# Patient Record
Sex: MALE | Race: WHITE | URBAN - METROPOLITAN AREA
[De-identification: names, ages, dates, MRNs, and addresses within clinical notes are randomized per-mention and may not be internally consistent; named-entity substitution may affect disease eponyms.]

---

## 2023-01-05 ENCOUNTER — PATIENT OUTREACH (OUTPATIENT)
Dept: FAMILY MEDICINE CLINIC | Facility: CLINIC | Age: 63
End: 2023-01-05

## 2023-01-05 NOTE — PROGRESS NOTES
[address] [facility]  Pt needs to reschedule appt with dr Patti Pierce to dr Cynthia Pereira

## 2023-01-25 ENCOUNTER — OFFICE VISIT (OUTPATIENT)
Dept: FAMILY MEDICINE CLINIC | Facility: CLINIC | Age: 63
End: 2023-01-25
Payer: COMMERCIAL

## 2023-01-25 VITALS
RESPIRATION RATE: 18 BRPM | TEMPERATURE: 98 F | WEIGHT: 228 LBS | SYSTOLIC BLOOD PRESSURE: 136 MMHG | BODY MASS INDEX: 32.64 KG/M2 | OXYGEN SATURATION: 98 % | DIASTOLIC BLOOD PRESSURE: 80 MMHG | HEIGHT: 70 IN | HEART RATE: 88 BPM

## 2023-01-25 DIAGNOSIS — M54.50 CHRONIC BILATERAL LOW BACK PAIN WITHOUT SCIATICA: ICD-10-CM

## 2023-01-25 DIAGNOSIS — Z12.11 SCREEN FOR COLON CANCER: ICD-10-CM

## 2023-01-25 DIAGNOSIS — G89.29 CHRONIC BILATERAL LOW BACK PAIN WITHOUT SCIATICA: ICD-10-CM

## 2023-01-25 DIAGNOSIS — E78.5 HYPERLIPIDEMIA, UNSPECIFIED HYPERLIPIDEMIA TYPE: Primary | ICD-10-CM

## 2023-01-25 RX ORDER — ROSUVASTATIN CALCIUM 10 MG/1
10 TABLET, COATED ORAL NIGHTLY
COMMUNITY
Start: 2022-12-28 | End: 2023-01-25

## 2023-01-25 RX ORDER — MELOXICAM 15 MG/1
TABLET ORAL
COMMUNITY
End: 2023-01-25

## 2023-01-25 RX ORDER — MELOXICAM 15 MG/1
15 TABLET ORAL DAILY PRN
Qty: 30 TABLET | Refills: 0 | Status: SHIPPED | OUTPATIENT
Start: 2023-01-25

## 2023-01-25 RX ORDER — ROSUVASTATIN CALCIUM 10 MG/1
10 TABLET, COATED ORAL NIGHTLY
Qty: 90 TABLET | Refills: 0 | Status: SHIPPED | OUTPATIENT
Start: 2023-01-25 | End: 2023-04-25

## 2023-01-31 ENCOUNTER — PATIENT MESSAGE (OUTPATIENT)
Dept: FAMILY MEDICINE CLINIC | Facility: CLINIC | Age: 63
End: 2023-01-31

## 2023-01-31 ENCOUNTER — TELEPHONE (OUTPATIENT)
Dept: FAMILY MEDICINE CLINIC | Facility: CLINIC | Age: 63
End: 2023-01-31

## 2023-01-31 NOTE — TELEPHONE ENCOUNTER
Called brother. States patient was watching TV and felt vertigo sensation, felt chills and had nausea/vomiting  Laid down for 1 hr and now feels ok  Eating lunch now  No vomiting  Vertigo sensation stopped  Hx of vertigo  States it happened in office when they last saw Dr Estelle Lipscomb if Dr Otis Padilla wanted to start medication  Aware she is out of office today  Advised if this happens again go to ER. Verbalized understanding.      See tel enc from 1/31/23

## 2023-01-31 NOTE — TELEPHONE ENCOUNTER
States patient was watching TV and felt vertigo sensation, felt chills and had nausea/vomiting  Laid down for 1 hr and now feels ok  Eating lunch now  No vomiting  Vertigo sensation stopped  Hx of vertigo  States it happened in office when they last saw Dr Tushar De Jesus if Dr Jesus Dumont wanted to start medication  Aware she is out of office today  Advised if this happens again go to ER. Verbalized understanding.

## 2023-01-31 NOTE — TELEPHONE ENCOUNTER
Regarding: Vertigo episode today  ----- Message from Jaden Parsons sent at 1/31/2023 11:46 AM CST -----       ----- Message from Kym Vargas to Rebekah Contreras MD sent at 1/31/2023 11:33 AM -----   Good Morning Dr. Katie Hernandez,    This is Félix's brother, Kacey Armstrong and Doreen Moy is currently having an issue with his Vertigo where he was sitting down watching TV and head started spinning, pain down his back , chills and then he started to vomit in the bathroom which i am not sure if he threw up or if it was the heaves, etc.     Do you want to see him or can you call him in some of the medicine that he should take or what shall we do?     We have been tracking his blood pressure and here are the results :  1/29 131/86 with pulse of 77 at 8:58am  1/30 130/81 with pulse of 89 at 7:45am  1/31 129/85 with pulse of 79 at 9:33am    Thanks  [facility]  [phone]

## 2023-02-01 RX ORDER — MECLIZINE HYDROCHLORIDE 25 MG/1
25 TABLET ORAL 2 TIMES DAILY PRN
Qty: 10 TABLET | Refills: 0 | Status: SHIPPED | OUTPATIENT
Start: 2023-02-01

## 2023-02-01 NOTE — TELEPHONE ENCOUNTER
Brother notified. Rx sent.     Future Appointments   Date Time Provider [facility]   4/21/2023  3:30 PM Key Burgos MD [facility]

## 2023-02-01 NOTE — TELEPHONE ENCOUNTER
With a history of vertigo, I can prescribe meclizine to use as needed.   Will need to follow-up if symptoms persist greater than few weeks  rx pended

## 2023-02-10 ENCOUNTER — TELEPHONE (OUTPATIENT)
Dept: FAMILY MEDICINE CLINIC | Facility: CLINIC | Age: 63
End: 2023-02-10

## 2023-02-10 DIAGNOSIS — R42 VERTIGO: Primary | ICD-10-CM

## 2023-02-10 RX ORDER — ONDANSETRON 4 MG/1
4 TABLET, FILM COATED ORAL DAILY PRN
Qty: 15 TABLET | Refills: 0 | Status: SHIPPED | OUTPATIENT
Start: 2023-02-10

## 2023-02-10 NOTE — TELEPHONE ENCOUNTER
Zofran sent to pharmacy  Use meclizine as needed  Place referral for Dr. Soniya Woodson -he can address vertigo if not better and also do ear check

## 2023-02-10 NOTE — TELEPHONE ENCOUNTER
LAST NIGHT HAD ANOTHER ISSUE WITH VERTIGO, VOMITING, VERY DIZZY, STOMACH PAINS,DIARREHA. HE HAS EATEN TODAY BUT JUST IS NOT FEELING WELL TODAY. BROTHER WOULD LIKE TO KNOW WHAT THE NEXT STEP? BLOOD PRESSURE HAS BEEN NORMAL. SHOULD HE SEE COME SEE  OR MAKE AN APPOINTMENT WITH ENT?   PLEASE ADVISE

## 2023-02-10 NOTE — TELEPHONE ENCOUNTER
Patient still having issues with vertigo  Last night he tried the meclizine - but threw the medication up so he had to wait until his stomach settled in order to try the medication  Nausea, vomiting, dizziness, diarrhea  Patient took the medication this morning and it helped   Brother wanting to know if zofran can be sent to help since he was sick much of the night because he could not keep meds down  Brother also wanting to know if patient should see ENT or follow up here  Blood pressure normal  No recent illness  No ear pain - but does get wax build up  When he removes his hearing aids feels like his ears are in a wind tunnel  Brother advised if patient develops fever or abdominal pain to go to IC

## 2023-02-17 ENCOUNTER — TELEPHONE (OUTPATIENT)
Dept: FAMILY MEDICINE CLINIC | Facility: CLINIC | Age: 63
End: 2023-02-17

## 2023-02-20 ENCOUNTER — PATIENT MESSAGE (OUTPATIENT)
Dept: FAMILY MEDICINE CLINIC | Facility: CLINIC | Age: 63
End: 2023-02-20

## 2023-02-20 RX ORDER — MELOXICAM 15 MG/1
15 TABLET ORAL DAILY PRN
Qty: 90 TABLET | Refills: 0 | Status: SHIPPED | OUTPATIENT
Start: 2023-02-20

## 2023-02-20 NOTE — TELEPHONE ENCOUNTER
From: Alvaro Padgett  To: Riaz Lee MD  Sent: 2/20/2023 12:48 PM CST  Subject: Blood Pressure Monitoring    This message is being sent by [employer] on behalf of Alvaro Padgett. Hi Dr. Key Brown,    This is [de-identified], Félix's brother and here are the blood pressure results from our monitoring and I wanted to know if we need to continue or can we stop taking his blood pressure? Can we also get Félix's Meloxicam refills changed to 6 months or 1 year prescriptions now since he has been taking this medicine daily and it really helps him? 1/29 131/86   1/30 130/81   1/31 129/85   2/1 129/84  2/2 132/87  2/3 124/77 and 133/82  2/4 136/87 and 131/91  2/5 128/90  2/6 116/79  2/20 128/85     I have tried taking his blood pressure in the morning or in the afternoon and they seem to be pretty consistent for him.      Thanks  Raudel

## 2023-02-27 RX ORDER — MELOXICAM 15 MG/1
15 TABLET ORAL DAILY PRN
Qty: 90 TABLET | Refills: 0 | OUTPATIENT
Start: 2023-02-27

## 2023-02-27 NOTE — TELEPHONE ENCOUNTER
LOV 1/25/23 to establish care. Meloxicam sent on 2/20/23 for #90 - Rx refused. ondansetron (ZOFRAN) 4 mg tablet 15 tablet 0 2/10/2023     meclizine 25 MG Oral Tab 10 tablet 0 2/1/2023     Future Appointments   Date Time Provider Cece Dorado   3/9/2023  9:30 AM [facility]   3/9/2023  9:45 AM Nishi Mike MD [facility]   4/21/2023  3:30 PM Rebekah Contreras MD [facility] [place]     Please advise.

## 2023-03-03 RX ORDER — ONDANSETRON 4 MG/1
4 TABLET, FILM COATED ORAL DAILY PRN
Qty: 15 TABLET | Refills: 0 | Status: SHIPPED | OUTPATIENT
Start: 2023-03-03

## 2023-03-03 RX ORDER — MECLIZINE HYDROCHLORIDE 25 MG/1
25 TABLET ORAL 2 TIMES DAILY PRN
Qty: 10 TABLET | Refills: 0 | Status: SHIPPED | OUTPATIENT
Start: 2023-03-03

## 2023-03-09 ENCOUNTER — OFFICE VISIT (OUTPATIENT)
Facility: LOCATION | Age: 63
End: 2023-03-09
Payer: COMMERCIAL

## 2023-03-09 VITALS — BODY MASS INDEX: 36 KG/M2 | WEIGHT: 250 LBS

## 2023-03-09 DIAGNOSIS — R42 VERTIGO: Primary | ICD-10-CM

## 2023-03-09 DIAGNOSIS — H81.10 BENIGN PAROXYSMAL POSITIONAL VERTIGO, UNSPECIFIED LATERALITY: Primary | ICD-10-CM

## 2023-03-09 DIAGNOSIS — H93.293 ABNORMAL AUDITORY PERCEPTION OF BOTH EARS: ICD-10-CM

## 2023-03-09 DIAGNOSIS — H90.3 SENSORY HEARING LOSS, BILATERAL: ICD-10-CM

## 2023-03-09 DIAGNOSIS — H93.13 TINNITUS OF BOTH EARS: ICD-10-CM

## 2023-03-09 PROCEDURE — 92567 TYMPANOMETRY: CPT | Performed by: AUDIOLOGIST

## 2023-03-09 PROCEDURE — 99204 OFFICE O/P NEW MOD 45 MIN: CPT | Performed by: OTOLARYNGOLOGY

## 2023-03-09 PROCEDURE — 92557 COMPREHENSIVE HEARING TEST: CPT | Performed by: AUDIOLOGIST

## 2023-03-09 RX ORDER — ONDANSETRON 4 MG/1
4 TABLET, ORALLY DISINTEGRATING ORAL EVERY 8 HOURS PRN
Qty: 20 TABLET | Refills: 2 | Status: SHIPPED | OUTPATIENT
Start: 2023-03-09

## 2023-03-09 RX ORDER — DIAZEPAM 2 MG/1
2 TABLET ORAL 3 TIMES DAILY
Qty: 90 TABLET | Refills: 0 | Status: SHIPPED | OUTPATIENT
Start: 2023-03-09

## 2023-03-09 NOTE — PROGRESS NOTES
Bess Calderon was seen for an audiometric evaluation and tympanogram today. Referred back to physician.     Judit Stephens MS, CCC-A

## 2023-03-15 ENCOUNTER — TELEPHONE (OUTPATIENT)
Dept: PHYSICAL THERAPY | Facility: HOSPITAL | Age: 63
End: 2023-03-15

## 2023-03-15 ENCOUNTER — APPOINTMENT (OUTPATIENT)
Dept: PHYSICAL THERAPY | Age: 63
End: 2023-03-15
Attending: OTOLARYNGOLOGY
Payer: MEDICAID

## 2023-03-22 ENCOUNTER — OFFICE VISIT (OUTPATIENT)
Dept: PHYSICAL THERAPY | Age: 63
End: 2023-03-22
Attending: OTOLARYNGOLOGY
Payer: MEDICAID

## 2023-03-22 DIAGNOSIS — H81.10 BENIGN PAROXYSMAL POSITIONAL VERTIGO, UNSPECIFIED LATERALITY: ICD-10-CM

## 2023-03-22 PROCEDURE — 97161 PT EVAL LOW COMPLEX 20 MIN: CPT

## 2023-03-22 PROCEDURE — 97112 NEUROMUSCULAR REEDUCATION: CPT

## 2023-03-22 NOTE — PROGRESS NOTES
VESTIBULAR EVALUATION:     Diagnosis:   Benign paroxysmal positional vertigo, unspecified laterality (H81.10)  Fall risk, gait abnormalities        Referring Provider: Kevin Hernandez  Date of Evaluation:    3/22/2023    Precautions:  None Next MD visit:   none scheduled  Date of Surgery: n/a     PATIENT SUMMARY   Anaya Arnett is a 66year old male who presents to therapy today with reports of last year around [facility] October had his first bout of vertigo. Went to ER. Testing was negative. He was given Meclizine- it helped relieve the issue. He was sent home. Went to ENT. This went away. He has had it 7 times since this time. He is on valium, he is nauseous. He states he has not had any episodes for about 4 days. He has been taking it easy. He is afraid is it going to happen again. He does not look down, he is careful in the shower, he lays on his L side. He gets dizzy laying in any other positions. He is very careful not to \"stir it up\"     Symptoms with cough/sneeze or loud noise: No  Falls: No   Has not driven since vertigo. Hx of migraines: Yes: used to take immitrex and lay in dark room. Has not taken this since [place]. Hx of vision issue: Yes: bright lights bother him lately   Hx of hearing issues: tinnitus    Dizziness: Current 0/10, Best 0/10, Worst 10/10  Quality: daze, worse than merry go round or yury wheel, \"feels like on a ride\"   Frequency/Duration:  Couple hours  Aggravates: Supine to/from sit, Bending over, Quick head movements and Looking/reaching up  Relieves: Not moving, Closing eyes, Sitting down and Lying down   He has been sitting in recliner, no light no sound, trying to see if that would help. Dizziness Handicap Inventory [facility]): 92     Current functional limitations include laying, bending, washing hair, donning doffing shoes .    Social history:  Has been limited all activities since dizziness started 2/2 to fear of worsening symptoms  Doreen Moy describes prior level of function dizziness on and off since September. Pt goals include to be able to \"get my normal life back\"   Past medical history was reviewed with Odalis Collado. Significant findings include  has a past medical history of Autism spectrum and Hyperlipidemia. Gloria Clark presents to physical therapy evaluation with primary c/o dizziness. The results of the objective tests and measures show negative positional testing at this time. Pt does have saccadic intrusions with smooth persuit. Additionally, some GEN direction changes which may be related to his medications at this time- will continue to monitor. No other central signs with oculomotor testing. Functional deficits include but are not limited to laying down, bending over, walking. Signs and symptoms are consistent with diagnosis of possible spontaneous recovery for BPPV. Will continue to monitor. Will assess for balance and fall risk next therapy session . Pt and PT discussed evaluation findings, pathology, POC and HEP. Pt voiced understanding and performs HEP correctly. Skilled Physical Therapy is medically necessary to address the above impairments and reach functional goals. OBJECTIVE:   Physical Exam:  Posture/Observation: forward shoulders, kyphosis   Neuro Screen: Sensation: SILT symmetrical      Coordination Testing:   Finger to Nose: WNL   Pronation/supination: WNL   Toe tapping:  WNL     Cervical spine ROM: Flex30, Ext 30 , R SB 20, L SB 20 , R ROT 30, L ROT 30   Adverse neuro signs with ROM: yes no: no     Occulomotor & Vestibulo-Ocular Exam:  Spontaneous Nystagmus: room light: ;  fixation blocked: WFL  Smooth Pursuit: saccadic intrusions  Saccades: Negative  Gaze Evoked Nystagmus:  room light: R beating with R gaze, L beating with L gaze; fixation blocked: Negative  Head Thrust: Negative  VOR screen:  negative    VOR Cancellation: Negative   Convergence: Not Tested  Cover/Uncover:  Not Tested  Cross Cover:  Not Tested  Head Shaking Nystagmus: Not Tested  Dynamic Visual Acuity:  Not Tested    Positional Testing:   Sharps-Hallpike: R negative- modified testing, L negative- modified testing   Roll Test [facility]     Functional Mobility:   Gait: pt ambulates on level ground with short shuffling gait pattern. Today's Treatment and Response:   Pt education was provided on exam findings, treatment diagnosis, treatment plan, expectations, and prognosis. Pt was also provided recommendations for BPPV, pathology, treatment, expectations, prognosis, plan of care, interventions and benefits, goals, follow up, recommendations for activities the remainder of the day, residual dizziness for remainder of the day    Patient was instructed in and issued a HEP for: return to normal activities. Stressed importance of head and neck movements, fear avoidance behaviors and negative side effects of this. Charges: PT Eval Low Complexity, neuro brooks: 1      Total Timed Treatment: 10 min     Total Treatment Time: 45 min     PLAN OF CARE:    Goals: (to be met in 8 visits)  1. Pt will demonstrate appropriate mechanics and independence with HEP to be met in 2 visits. 2. Pt will demonstrate ability to transfer supine >sit with dizziness <3/10 to be met in 3 visits  3. Pt will demonstrate ability to look up with dizziness <3/10 to be met in 3 visits. Frequency / Duration: Patient will be seen for 1-2 x/week or a total of 8 visits over a 90 day period. Treatment will include: home exercise program development and instruction, patient/family education, balance training, canalith repositioning maneuver, eye/head coordination exercises, sensory organization training, optokinetic stimulation , ROM, exertion training, gait training, manual therapy and strengthening. Education or treatment limitation: None   Rehab Potential: good     Patient/Family/Caregiver was advised of these findings, precautions, and treatment options and has agreed to actively participate in planning and for this course of care. Thank you for your referral. Please co-sign or sign and return this letter via fax as soon as possible to [phone]. If you have any questions, please contact me at Dept: [phone]    Sincerely,  Electronically signed by therapist: Gin Parsons PT  [de-identified] certification required: Yes  I certify the need for these services furnished under this plan of treatment and while under my care.     X___________________________________________________ Date____________________    Certification From: 1/05/8516  To:6/20/2023

## 2023-03-23 ENCOUNTER — APPOINTMENT (OUTPATIENT)
Dept: PHYSICAL THERAPY | Age: 63
End: 2023-03-23
Attending: OTOLARYNGOLOGY
Payer: MEDICAID

## 2023-03-30 ENCOUNTER — APPOINTMENT (OUTPATIENT)
Dept: PHYSICAL THERAPY | Age: 63
End: 2023-03-30
Attending: OTOLARYNGOLOGY
Payer: MEDICAID

## 2023-04-03 ENCOUNTER — TELEPHONE (OUTPATIENT)
Dept: FAMILY MEDICINE CLINIC | Facility: CLINIC | Age: 63
End: 2023-04-03

## 2023-04-03 DIAGNOSIS — R42 VERTIGO: Primary | ICD-10-CM

## 2023-04-05 ENCOUNTER — OFFICE VISIT (OUTPATIENT)
Dept: PHYSICAL THERAPY | Age: 63
End: 2023-04-05
Attending: OTOLARYNGOLOGY
Payer: MEDICAID

## 2023-04-05 PROCEDURE — 97110 THERAPEUTIC EXERCISES: CPT

## 2023-04-05 NOTE — PROGRESS NOTES
Diagnosis:   Benign paroxysmal positional vertigo, unspecified laterality (H81.10)  Fall risk, gait abnormalities      Referring Provider: Robson Lopes  Date of Evaluation:    3/22/2023    Precautions:  None Next MD visit:   none scheduled  Date of Surgery: n/a   Insurance Primary/Secondary: MEDICAID / N/A     # Auth Visits: medicaid           Progress Summary  Pt has attended 2 visits in Physical Therapy. Subjective: Pt states 2 days ago. He started with stomach pain, ringing in ear, went into episode of dizziness. He states he threw up 10 times, diarrhea- 6 hours. \"dazed and spinning\"  - he took his medication and this took 2 hours to kick in. He was in bed for the last 2 days. He arrives today for reassessment. He states no more than a daze yesterday. No dizziness since this time. He took a shower and symptoms were relatively unremarkable. Pain: 0/10- not chief complaint. Dizziness 0/10    Objective:   HR: 77 bpm  Sp02: 98%   BP: 118/75 mmhg     Oculomotor exam:   sm persuit: negative   Gaze evoked nystagmus negative  Spontaneous nystagmus   VOR and VOR cancellation: negative     Cervical AROM   Limited grossly all directions but unremarkable for symptoms through available range- flexion/extension/rotation R/L     Mobility and functional movements:   Sidelying R/L: WFL no symptoms   Supine <>sit: WFL no symptoms   Modified hallpike negative R/L     Gait: no ataxia noted     Assessment: during reassessment vitals unremarkable. Cervical, oculomotor, postural and positional screens all negative for symptom reproduction. Based on subjective it is possible pt had GI virus causing dehydration and potentially triggering this episode. He was educated on hydration and slow return to PLOF. He was 2 weeks symptom free prior to this episode and has no symptoms during session today. Updated POC below. Pt was very pleasant and appreciative. Goals: (to be met in 8 visits)   1.  Pt will demonstrate appropriate mechanics and independence with HEP to be met in 2 visits. 2. Pt will demonstrate ability to transfer supine >sit with dizziness <3/10 to be met in 3 visits  3. Pt will demonstrate ability to look up with dizziness <3/10 to be met in 3 visits. Plan: Continue per plan of care if symptoms persist. If pt is without symptoms for 6-8 weeks and has no further contact with this office- discharge to indep HEP at that time. Patient/Family/Caregiver was advised of these findings, precautions, and treatment options and has agreed to actively participate in planning and for this course of care. Thank you for your referral. If you have any questions, please contact me at Dept: [phone]    Sincerely,  Electronically signed by therapist: Micky Hyde PT   Date: 4/5/2023  TX#: 2/6 Date:                 TX#: 3/ Date:                 TX#: 4/ Date:                 TX#: 5/ Date: Tx#: 6/   Neuro brooks: (30 min)  Reassessment above 20 min  Bed mobility: Supine, SL R/L unremarkable, Supine<>Sit unremarkable 5 min  Pt education: possible pathology, negative s/s for peripheral cause at this time.  Plan of care, answered all questions and concerns                            HEP: none    Charges: Neuro brooks: 2  (there ex: 2)- per medicaid        Total Timed Treatment: 30 min  Total Treatment Time: 30 min

## 2023-04-06 ENCOUNTER — APPOINTMENT (OUTPATIENT)
Dept: PHYSICAL THERAPY | Age: 63
End: 2023-04-06
Attending: OTOLARYNGOLOGY
Payer: MEDICAID

## 2023-04-12 ENCOUNTER — APPOINTMENT (OUTPATIENT)
Dept: PHYSICAL THERAPY | Age: 63
End: 2023-04-12
Attending: OTOLARYNGOLOGY
Payer: MEDICAID

## 2023-04-17 ENCOUNTER — NURSE ONLY (OUTPATIENT)
Dept: FAMILY MEDICINE CLINIC | Facility: CLINIC | Age: 63
End: 2023-04-17
Payer: COMMERCIAL

## 2023-04-17 DIAGNOSIS — R19.5 OCCULT BLOOD IN STOOLS: Primary | ICD-10-CM

## 2023-04-17 PROCEDURE — 82274 ASSAY TEST FOR BLOOD FECAL: CPT | Performed by: FAMILY MEDICINE

## 2023-04-20 ENCOUNTER — APPOINTMENT (OUTPATIENT)
Dept: PHYSICAL THERAPY | Age: 63
End: 2023-04-20
Attending: OTOLARYNGOLOGY
Payer: MEDICAID

## 2023-04-20 ENCOUNTER — OFFICE VISIT (OUTPATIENT)
Dept: NEUROLOGY | Facility: CLINIC | Age: 63
End: 2023-04-20
Payer: COMMERCIAL

## 2023-04-20 VITALS
BODY MASS INDEX: 36 KG/M2 | WEIGHT: 250.5 LBS | DIASTOLIC BLOOD PRESSURE: 68 MMHG | SYSTOLIC BLOOD PRESSURE: 130 MMHG | HEART RATE: 64 BPM | RESPIRATION RATE: 16 BRPM

## 2023-04-20 DIAGNOSIS — R42 VERTIGO: Primary | ICD-10-CM

## 2023-04-20 DIAGNOSIS — H93.19 TINNITUS, UNSPECIFIED LATERALITY: ICD-10-CM

## 2023-04-20 DIAGNOSIS — G43.009 MIGRAINE WITHOUT AURA AND WITHOUT STATUS MIGRAINOSUS, NOT INTRACTABLE: ICD-10-CM

## 2023-04-20 PROCEDURE — 3075F SYST BP GE 130 - 139MM HG: CPT | Performed by: OTHER

## 2023-04-20 PROCEDURE — 3078F DIAST BP <80 MM HG: CPT | Performed by: OTHER

## 2023-04-20 PROCEDURE — 99204 OFFICE O/P NEW MOD 45 MIN: CPT | Performed by: OTHER

## 2023-04-20 RX ORDER — DIVALPROEX SODIUM 250 MG/1
250 TABLET, EXTENDED RELEASE ORAL DAILY
Qty: 60 TABLET | Refills: 3 | Status: SHIPPED | OUTPATIENT
Start: 2023-04-20

## 2023-04-20 RX ORDER — DIAZEPAM 5 MG/1
5 TABLET ORAL DAILY PRN
Qty: 2 TABLET | Refills: 0 | Status: SHIPPED | OUTPATIENT
Start: 2023-04-20 | End: 2023-04-21

## 2023-04-21 ENCOUNTER — OFFICE VISIT (OUTPATIENT)
Dept: FAMILY MEDICINE CLINIC | Facility: CLINIC | Age: 63
End: 2023-04-21
Payer: COMMERCIAL

## 2023-04-21 ENCOUNTER — TELEPHONE (OUTPATIENT)
Dept: FAMILY MEDICINE CLINIC | Facility: CLINIC | Age: 63
End: 2023-04-21

## 2023-04-21 VITALS
WEIGHT: 216 LBS | SYSTOLIC BLOOD PRESSURE: 128 MMHG | HEIGHT: 70 IN | TEMPERATURE: 97 F | DIASTOLIC BLOOD PRESSURE: 80 MMHG | HEART RATE: 69 BPM | RESPIRATION RATE: 16 BRPM | OXYGEN SATURATION: 97 % | BODY MASS INDEX: 30.92 KG/M2

## 2023-04-21 DIAGNOSIS — Z00.00 ANNUAL PHYSICAL EXAM: Primary | ICD-10-CM

## 2023-04-21 DIAGNOSIS — E78.5 HYPERLIPIDEMIA, UNSPECIFIED HYPERLIPIDEMIA TYPE: ICD-10-CM

## 2023-04-21 RX ORDER — MELOXICAM 15 MG/1
15 TABLET ORAL DAILY PRN
Qty: 90 TABLET | Refills: 0 | Status: SHIPPED | OUTPATIENT
Start: 2023-04-21

## 2023-04-21 NOTE — TELEPHONE ENCOUNTER
Called [facility] and spoke with Melony Banerjee  Per [employer] lab never received the specimen.   Patient's brother Talbot Brunner notified at [address] with  on 4/21/23

## 2023-04-22 LAB — HEMOCCULT STL QL: NEGATIVE

## 2023-04-27 ENCOUNTER — APPOINTMENT (OUTPATIENT)
Dept: PHYSICAL THERAPY | Age: 63
End: 2023-04-27
Attending: OTOLARYNGOLOGY
Payer: MEDICAID

## 2023-05-01 ENCOUNTER — HOSPITAL ENCOUNTER (EMERGENCY)
Facility: HOSPITAL | Age: 63
Discharge: HOME | End: 2023-05-02
Attending: EMERGENCY MEDICINE
Payer: MEDICARE

## 2023-05-01 ENCOUNTER — APPOINTMENT (OUTPATIENT)
Dept: MRI IMAGING | Facility: HOSPITAL | Age: 63
End: 2023-05-01
Attending: EMERGENCY MEDICINE
Payer: MEDICARE

## 2023-05-01 ENCOUNTER — TELEPHONE (OUTPATIENT)
Dept: NEUROLOGY | Facility: CLINIC | Age: 63
End: 2023-05-01

## 2023-05-01 DIAGNOSIS — R42 VERTIGO: Primary | ICD-10-CM

## 2023-05-01 LAB
ALBUMIN SERPL-MCNC: 3.9 G/DL (ref 3.4–5)
ALBUMIN/GLOB SERPL: 1.2 {RATIO} (ref 1–2)
ALP LIVER SERPL-CCNC: 68 U/L
ALT SERPL-CCNC: 28 U/L
ANION GAP SERPL CALC-SCNC: 6 MMOL/L (ref 0–18)
AST SERPL-CCNC: 15 U/L (ref 15–37)
BASOPHILS # BLD AUTO: 0.04 X10(3) UL (ref 0–0.2)
BASOPHILS NFR BLD AUTO: 0.6 %
BILIRUB SERPL-MCNC: 0.4 MG/DL (ref 0.1–2)
BUN BLD-MCNC: 15 MG/DL (ref 7–18)
CALCIUM BLD-MCNC: 9 MG/DL (ref 8.5–10.1)
CHLORIDE SERPL-SCNC: 108 MMOL/L (ref 98–112)
CO2 SERPL-SCNC: 27 MMOL/L (ref 21–32)
CREAT BLD-MCNC: 0.98 MG/DL
EOSINOPHIL # BLD AUTO: 0.1 X10(3) UL (ref 0–0.7)
EOSINOPHIL NFR BLD AUTO: 1.5 %
ERYTHROCYTE [DISTWIDTH] IN BLOOD BY AUTOMATED COUNT: 14.8 %
GFR SERPLBLD BASED ON 1.73 SQ M-ARVRAT: 88 ML/MIN/1.73M2 (ref 60–?)
GLOBULIN PLAS-MCNC: 3.3 G/DL (ref 2.8–4.4)
GLUCOSE BLD-MCNC: 95 MG/DL (ref 70–99)
HCT VFR BLD AUTO: 50.4 %
HGB BLD-MCNC: 16.9 G/DL
IMM GRANULOCYTES # BLD AUTO: 0.02 X10(3) UL (ref 0–1)
IMM GRANULOCYTES NFR BLD: 0.3 %
LYMPHOCYTES # BLD AUTO: 2.35 X10(3) UL (ref 1–4)
LYMPHOCYTES NFR BLD AUTO: 36.3 %
MCH RBC QN AUTO: 29.4 PG (ref 26–34)
MCHC RBC AUTO-ENTMCNC: 33.5 G/DL (ref 31–37)
MCV RBC AUTO: 87.7 FL
MONOCYTES # BLD AUTO: 0.54 X10(3) UL (ref 0.1–1)
MONOCYTES NFR BLD AUTO: 8.3 %
NEUTROPHILS # BLD AUTO: 3.42 X10 (3) UL (ref 1.5–7.7)
NEUTROPHILS # BLD AUTO: 3.42 X10(3) UL (ref 1.5–7.7)
NEUTROPHILS NFR BLD AUTO: 53 %
OSMOLALITY SERPL CALC.SUM OF ELEC: 293 MOSM/KG (ref 275–295)
PLATELET # BLD AUTO: 211 10(3)UL (ref 150–450)
POTASSIUM SERPL-SCNC: 3.9 MMOL/L (ref 3.5–5.1)
PROT SERPL-MCNC: 7.2 G/DL (ref 6.4–8.2)
RBC # BLD AUTO: 5.75 X10(6)UL
SODIUM SERPL-SCNC: 141 MMOL/L (ref 136–145)
TROPONIN I HIGH SENSITIVITY: <3 NG/L
WBC # BLD AUTO: 6.5 X10(3) UL (ref 4–11)

## 2023-05-01 PROCEDURE — 85025 COMPLETE CBC W/AUTO DIFF WBC: CPT

## 2023-05-01 PROCEDURE — 93005 ELECTROCARDIOGRAM TRACING: CPT

## 2023-05-01 PROCEDURE — A9575 INJ GADOTERATE MEGLUMI 0.1ML: HCPCS

## 2023-05-01 PROCEDURE — 96361 HYDRATE IV INFUSION ADD-ON: CPT

## 2023-05-01 PROCEDURE — 93010 ELECTROCARDIOGRAM REPORT: CPT

## 2023-05-01 PROCEDURE — 70544 MR ANGIOGRAPHY HEAD W/O DYE: CPT | Performed by: EMERGENCY MEDICINE

## 2023-05-01 PROCEDURE — 85025 COMPLETE CBC W/AUTO DIFF WBC: CPT | Performed by: EMERGENCY MEDICINE

## 2023-05-01 PROCEDURE — 80053 COMPREHEN METABOLIC PANEL: CPT

## 2023-05-01 PROCEDURE — 70553 MRI BRAIN STEM W/O & W/DYE: CPT | Performed by: EMERGENCY MEDICINE

## 2023-05-01 PROCEDURE — 84484 ASSAY OF TROPONIN QUANT: CPT

## 2023-05-01 PROCEDURE — 99285 EMERGENCY DEPT VISIT HI MDM: CPT

## 2023-05-01 PROCEDURE — 99284 EMERGENCY DEPT VISIT MOD MDM: CPT

## 2023-05-01 PROCEDURE — 96374 THER/PROPH/DIAG INJ IV PUSH: CPT

## 2023-05-01 PROCEDURE — 96375 TX/PRO/DX INJ NEW DRUG ADDON: CPT

## 2023-05-01 PROCEDURE — 84484 ASSAY OF TROPONIN QUANT: CPT | Performed by: EMERGENCY MEDICINE

## 2023-05-01 PROCEDURE — 80053 COMPREHEN METABOLIC PANEL: CPT | Performed by: EMERGENCY MEDICINE

## 2023-05-01 RX ORDER — SODIUM CHLORIDE 9 MG/ML
1000 INJECTION, SOLUTION INTRAVENOUS ONCE
Status: COMPLETED | OUTPATIENT
Start: 2023-05-01 | End: 2023-05-02

## 2023-05-01 RX ORDER — ONDANSETRON 2 MG/ML
4 INJECTION INTRAMUSCULAR; INTRAVENOUS ONCE
Status: COMPLETED | OUTPATIENT
Start: 2023-05-01 | End: 2023-05-01

## 2023-05-01 RX ORDER — GADOTERATE MEGLUMINE 376.9 MG/ML
20 INJECTION INTRAVENOUS
Status: COMPLETED | OUTPATIENT
Start: 2023-05-01 | End: 2023-05-01

## 2023-05-01 RX ORDER — LORAZEPAM 2 MG/ML
1 INJECTION INTRAMUSCULAR ONCE
Status: COMPLETED | OUTPATIENT
Start: 2023-05-01 | End: 2023-05-01

## 2023-05-01 NOTE — TELEPHONE ENCOUNTER
Pt's brother following up on call from this morning. Pt has not improved. Please advise as soon as possible at [phone].

## 2023-05-01 NOTE — TELEPHONE ENCOUNTER
Pt is extremely sick and in bed for 2 days, room spinning, 2 meal in 2 days, bad headache also, has been taking meds as directed. Transferred call to nurse.

## 2023-05-01 NOTE — TELEPHONE ENCOUNTER
S:  Patient in bed since yesterday around lunch    B: Patient has episodes of vertigo/migraines    Per LOV notes from 04/20/2023:  Orders Placed This Encounter      Referral to Physical Therapy and Rehab      MRI BRAIN/IAC (ALL W+WO CNTRST) (CPT=70553)  EEG  depakote low dose trial  Diazepam, meclizine, zofran prn  ENT to follow  See orders and medications filed with this encounter. The patient indicates understanding of these issues and agrees with the plan. Discussed with patient/family regarding assessment, work up, care plan and possible adverse and side effects of the medications. I called his brother Raudel re; care plan, diazepam sent in case he needs it prior to MRI  RTC 3 months, requested update in 2 weeks, may increase depakote to 250 mg bid if tolerating well  Pt should go ER for any new or worsening symptoms and contact office. A:  Patient stood up from the lunch table yesterday. He was nauseous. He took medication but then sat in the bathroom all afternoon, he never did vomit. He went to his room and has been there ever since. He has not been able to do anything but sit in his dark room under his blankets. He was successful for the last 10 days without any episodes. Patient has not missed any medication doses. Divalproex 250 mg daily. Inquired on fluids. Patient intakes around 50 oz of water a day with decaf coffee.      R:

## 2023-05-02 VITALS
WEIGHT: 210 LBS | TEMPERATURE: 98 F | SYSTOLIC BLOOD PRESSURE: 117 MMHG | HEART RATE: 64 BPM | RESPIRATION RATE: 14 BRPM | BODY MASS INDEX: 30.06 KG/M2 | DIASTOLIC BLOOD PRESSURE: 77 MMHG | HEIGHT: 70 IN | OXYGEN SATURATION: 95 %

## 2023-05-02 LAB
ATRIAL RATE: 73 BPM
P AXIS: 39 DEGREES
P-R INTERVAL: 154 MS
Q-T INTERVAL: 380 MS
QRS DURATION: 70 MS
QTC CALCULATION (BEZET): 418 MS
R AXIS: -37 DEGREES
T AXIS: 49 DEGREES
VENTRICULAR RATE: 73 BPM

## 2023-05-02 RX ORDER — ONDANSETRON 4 MG/1
4 TABLET, ORALLY DISINTEGRATING ORAL EVERY 4 HOURS PRN
Qty: 10 TABLET | Refills: 0 | Status: SHIPPED | OUTPATIENT
Start: 2023-05-02 | End: 2023-05-09

## 2023-05-02 RX ORDER — LORAZEPAM 0.5 MG/1
0.5 TABLET ORAL EVERY 6 HOURS PRN
Qty: 15 TABLET | Refills: 0 | Status: SHIPPED | OUTPATIENT
Start: 2023-05-02 | End: 2023-05-04

## 2023-05-02 NOTE — ED INITIAL ASSESSMENT (HPI)
Pt c/o HA, dizziness/lightheadedness, n/v/d for 2 days. States hx Vertigo. He denies abd pain.  Pt denies CP, he denies ROXANNE

## 2023-05-02 NOTE — DISCHARGE INSTRUCTIONS
DO NOT TAKE DIAZEPAM AT [address] [place] [address] ED IF SYMPTOMS WORSEN OR IF ANY OTHER PROBLEMS ARISE

## 2023-05-04 ENCOUNTER — PATIENT MESSAGE (OUTPATIENT)
Dept: NEUROLOGY | Facility: CLINIC | Age: 63
End: 2023-05-04

## 2023-05-04 ENCOUNTER — APPOINTMENT (OUTPATIENT)
Dept: PHYSICAL THERAPY | Age: 63
End: 2023-05-04
Attending: OTOLARYNGOLOGY
Payer: MEDICAID

## 2023-05-04 RX ORDER — LORAZEPAM 0.5 MG/1
0.5 TABLET ORAL EVERY 6 HOURS PRN
Qty: 20 TABLET | Refills: 0 | Status: SHIPPED | OUTPATIENT
Start: 2023-05-04

## 2023-05-04 NOTE — TELEPHONE ENCOUNTER
From: Fidencio Valero  To: Edith Scheuermann, MD  Sent: 5/4/2023 9:05 AM CDT  Subject: Félix's Medication Refill    This message is being sent by [employer] on behalf of Dylon Guzmán. Good morning Doctor,    Dell Gonzáles has stated that Lorazepam is working better than the other medication and please go ahead and prescribe him this medication since he only has a couple of days left worth from the emergency room.     Thanks  Raudel

## 2023-05-05 ENCOUNTER — TELEPHONE (OUTPATIENT)
Dept: NEUROLOGY | Facility: CLINIC | Age: 63
End: 2023-05-05

## 2023-05-05 ENCOUNTER — NURSE ONLY (OUTPATIENT)
Dept: ELECTROPHYSIOLOGY | Facility: HOSPITAL | Age: 63
End: 2023-05-05
Attending: Other
Payer: MEDICAID

## 2023-05-05 DIAGNOSIS — R42 VERTIGO: ICD-10-CM

## 2023-05-05 DIAGNOSIS — H93.19 TINNITUS, UNSPECIFIED LATERALITY: ICD-10-CM

## 2023-05-05 PROCEDURE — 95816 EEG AWAKE AND DROWSY: CPT

## 2023-05-05 NOTE — TELEPHONE ENCOUNTER
Pt's brother, Kristine Garcia is calling to state pt has the Lorazepam that he will be picking up but the pt will be alone once they leave for vacation and Raudel is just needing clarity on how the pt will be able to get his refills on the medication since he can't drive and no one will be around to get any additional medication.  Raudel is requesting to speak with nurse or provider at [phone]

## 2023-05-05 NOTE — TELEPHONE ENCOUNTER
Patient has been using the lorazepam every 6 hours. Advised that this medication is only as needed. As of today he will be using as needed. Patient will call if needed to have medication refilled.

## 2023-05-08 NOTE — PROCEDURES
Date of Procedure: 5/5/2023    Procedure: EEG (ELECTROENCEPHALOGRAM) 41-60 minutes    DIAGNOSIS: VERTIGO  HISTORY: A 66YEAR OLD MALE PRESENTS WITH COMPLAINTS OF CHRONIC HEADACHES. ASSOCIATED SYMPTOMS INCLUDE TINNITIUS, NAUSEA, VOMITING, DIZZINESS AND HEARING LOSS. EPISODES BEGAN IN AUGUST 2022. HE STATES HE HAS HAD 21 TOTAL EPISODES. NO ASSOCIATED NUMBNESS, TINGLING, SLURRED SPEECH OR WEAKNESS. NO ABNORMAL MOVEMENTS NOTED. NO HISTORY OF SEIZURES  PAST MEDICAL HISTORY: AUTISM SPECTRUM, HYPERLIPIDEMIA  MEDICATIONS: LORAZEPAM, ONDANSETRON, MELOXICAM, DIVALPROEX  PREVIOUS EEG: NONE  MRI: 5/1/23 MILD CHANGES AS ABOVE. NO ACUTE INFARCT, OR LARGE VESSEL OCCLUSION. NO MASS LESION  PATIENT STATE: ALERT AND ORIENTED X 3  PATIENT STATE DURING EEG: AWAKE,     BACKGROUND ACTIVITY: Posterior rhythm was in the range of 8-10 Hz, reactive to eye opening; symmetrical and synchronous. Noted also are intermittent slowing of background activity. Drowsiness is characterized by intermittent theta waves bitemporally. Occasional sharp transients noted in posterior region. EPILEPTIFORM DISCHARGES: There were no epileptiform discharges or periodic lateralized epileptiform discharges (PLEDs) recorded throughout the recording. HYPERVENTILATION: Hyperventilation was performed with no change. PHOTIC STIMULATION: Photic stimulation was performed with no change. Stage II sleep was not reached. IMPRESSION: This is a normal awake and drowsy EEG. However, this does not rule out seizure disorder. Clinical correlation is advised.     Dinorah Jimenez MD   Neurology  [facility]  5/8/2023, 3:33 PM  CC: Eliana Yarbrough MD

## 2023-05-09 ENCOUNTER — TELEPHONE (OUTPATIENT)
Dept: NEUROLOGY | Facility: CLINIC | Age: 63
End: 2023-05-09

## 2023-05-09 NOTE — TELEPHONE ENCOUNTER
Call received from patient requesting EEG results. Patient notified that EEG normal. Patient also requested result be sent to him via [employer] attachment. Results sent to patient via [address]

## 2023-05-11 ENCOUNTER — APPOINTMENT (OUTPATIENT)
Dept: PHYSICAL THERAPY | Age: 63
End: 2023-05-11
Attending: OTOLARYNGOLOGY
Payer: MEDICAID

## 2023-05-23 ENCOUNTER — OFFICE VISIT (OUTPATIENT)
Dept: NEUROLOGY | Facility: CLINIC | Age: 63
End: 2023-05-23
Payer: COMMERCIAL

## 2023-05-23 VITALS
SYSTOLIC BLOOD PRESSURE: 118 MMHG | HEART RATE: 77 BPM | WEIGHT: 210 LBS | BODY MASS INDEX: 30 KG/M2 | DIASTOLIC BLOOD PRESSURE: 66 MMHG | RESPIRATION RATE: 16 BRPM

## 2023-05-23 DIAGNOSIS — H93.19 TINNITUS, UNSPECIFIED LATERALITY: Primary | ICD-10-CM

## 2023-05-23 PROCEDURE — 3074F SYST BP LT 130 MM HG: CPT | Performed by: OTHER

## 2023-05-23 PROCEDURE — 99214 OFFICE O/P EST MOD 30 MIN: CPT | Performed by: OTHER

## 2023-05-23 PROCEDURE — 3078F DIAST BP <80 MM HG: CPT | Performed by: OTHER

## 2023-05-23 RX ORDER — LORAZEPAM 0.5 MG/1
0.5 TABLET ORAL EVERY 6 HOURS PRN
Qty: 20 TABLET | Refills: 0 | Status: SHIPPED | OUTPATIENT
Start: 2023-05-23

## 2023-05-23 RX ORDER — ROSUVASTATIN CALCIUM 10 MG/1
10 TABLET, COATED ORAL NIGHTLY
COMMUNITY
Start: 2023-05-04

## 2023-05-25 ENCOUNTER — APPOINTMENT (OUTPATIENT)
Dept: PHYSICAL THERAPY | Age: 63
End: 2023-05-25
Attending: OTOLARYNGOLOGY
Payer: MEDICAID

## 2023-05-30 NOTE — TELEPHONE ENCOUNTER
LOV 4/21/23 for a physical.     Cholesterol Medication Protocol Failed 05/27/2023 11:37 PM   Protocol Details  Lipid panel within past 12 months    ALT < 80    ALT resulted within past year    Appointment within past 12 or next 3 months        Future Appointments   Date Time Provider Cece Dorado   9/25/2023  1:00 PM Cyrus Shore MD [facility] [address] [facility] [address]      Your OV note from April says \"bloodwork reviewed\" -- did pt have paper results with him?

## 2023-06-01 RX ORDER — ROSUVASTATIN CALCIUM 10 MG/1
10 TABLET, COATED ORAL NIGHTLY
Qty: 90 TABLET | Refills: 1 | Status: SHIPPED | OUTPATIENT
Start: 2023-06-01

## 2023-06-12 RX ORDER — MELOXICAM 15 MG/1
15 TABLET ORAL DAILY PRN
Qty: 90 TABLET | Refills: 0 | Status: SHIPPED | OUTPATIENT
Start: 2023-06-12

## 2023-09-10 ENCOUNTER — TELEPHONE (OUTPATIENT)
Dept: FAMILY MEDICINE CLINIC | Facility: CLINIC | Age: 63
End: 2023-09-10

## 2023-09-11 RX ORDER — MELOXICAM 15 MG/1
15 TABLET ORAL DAILY PRN
Qty: 90 TABLET | Refills: 0 | OUTPATIENT
Start: 2023-09-11

## 2023-09-12 RX ORDER — MELOXICAM 15 MG/1
15 TABLET ORAL DAILY PRN
Qty: 90 TABLET | Refills: 0 | Status: SHIPPED | OUTPATIENT
Start: 2023-09-12

## 2023-09-12 RX ORDER — ROSUVASTATIN CALCIUM 10 MG/1
10 TABLET, COATED ORAL NIGHTLY
Qty: 90 TABLET | Refills: 0 | Status: SHIPPED | OUTPATIENT
Start: 2023-09-12

## 2023-10-17 ENCOUNTER — OFFICE VISIT (OUTPATIENT)
Dept: NEUROLOGY | Facility: CLINIC | Age: 63
End: 2023-10-17
Payer: COMMERCIAL

## 2023-10-17 VITALS
SYSTOLIC BLOOD PRESSURE: 122 MMHG | DIASTOLIC BLOOD PRESSURE: 78 MMHG | BODY MASS INDEX: 29 KG/M2 | WEIGHT: 200 LBS | HEART RATE: 74 BPM

## 2023-10-17 DIAGNOSIS — G43.009 MIGRAINE WITHOUT AURA AND WITHOUT STATUS MIGRAINOSUS, NOT INTRACTABLE: Primary | ICD-10-CM

## 2023-10-17 PROCEDURE — 99214 OFFICE O/P EST MOD 30 MIN: CPT | Performed by: OTHER

## 2023-10-17 PROCEDURE — 3078F DIAST BP <80 MM HG: CPT | Performed by: OTHER

## 2023-10-17 PROCEDURE — 3074F SYST BP LT 130 MM HG: CPT | Performed by: OTHER

## 2023-10-17 RX ORDER — DIVALPROEX SODIUM 250 MG/1
250 TABLET, EXTENDED RELEASE ORAL DAILY
Qty: 90 TABLET | Refills: 3 | Status: SHIPPED | OUTPATIENT
Start: 2023-10-17

## 2023-11-29 NOTE — TELEPHONE ENCOUNTER
LOV 4/21/23 for a px.    Cholesterol Medication Protocol [id number]/29/2023 09:09 AM   Protocol Details Lipid panel within past 12 months    ALT < 80    ALT resulted within past year    Appointment within past 12 or next 3 months     Medication Quantity Refills Start End   Meloxicam 15 MG Oral Tab 90 tablet 0 9/12/2023    Sig:   Take 1 tablet (15 mg total) by mouth daily as needed for Pain.       No future appointments.   I do not see a lipid on file for him.  Your OV from April says his lipids are stable.  Please advise.

## 2023-11-29 NOTE — TELEPHONE ENCOUNTER
Spoke with the pharmacy who states to disregard the refill request.     Medication: DIVALPROEX  MG Oral Tablet 24 Hr      Date of last refill: 10/17/2023 (#90/3)  [place]     Last office visit: 10/17/2023  Due back to clinic per last office note:  9-12 months  Date next office visit scheduled:    No future appointments. Last OV note recommendation:    Plan:  Cont depakote low dose, refills sent  See orders and medications filed with this encounter. The patient indicates understanding of these issues and agrees with the plan. Discussed with patient/family regarding assessment, care plan and possible adverse and side effects of the medications.    RTC 9-12 months  Pt should go ER for any new or worsening

## 2023-11-30 RX ORDER — ROSUVASTATIN CALCIUM 10 MG/1
10 TABLET, COATED ORAL NIGHTLY
Qty: 90 TABLET | Refills: 0 | Status: SHIPPED | OUTPATIENT
Start: 2023-11-30 | End: 2024-02-26

## 2023-11-30 RX ORDER — MELOXICAM 15 MG/1
15 TABLET ORAL DAILY PRN
Qty: 90 TABLET | Refills: 0 | Status: SHIPPED | OUTPATIENT
Start: 2023-11-30 | End: 2024-02-26

## 2023-12-04 RX ORDER — DIVALPROEX SODIUM 250 MG/1
250 TABLET, EXTENDED RELEASE ORAL DAILY
Qty: 60 TABLET | Refills: 0 | OUTPATIENT
Start: 2023-12-04

## 2024-01-25 ENCOUNTER — PATIENT OUTREACH (OUTPATIENT)
Dept: FAMILY MEDICINE CLINIC | Facility: CLINIC | Age: 64
End: 2024-01-25

## 2024-02-26 RX ORDER — MELOXICAM 15 MG/1
15 TABLET ORAL DAILY PRN
Qty: 90 TABLET | Refills: 0 | Status: SHIPPED | OUTPATIENT
Start: 2024-02-26

## 2024-02-26 RX ORDER — ROSUVASTATIN CALCIUM 10 MG/1
10 TABLET, COATED ORAL NIGHTLY
Qty: 90 TABLET | Refills: 0 | Status: SHIPPED | OUTPATIENT
Start: 2024-02-26

## 2024-02-26 NOTE — TELEPHONE ENCOUNTER
Cholesterol Medication Protocol [id number]/26/2024 10:02 AM   Protocol Details Lipid panel within past 12 months    ALT < 80    ALT resulted within past year    In person appointment or virtual visit in the past 12 mos or appointment in next 3       Last OV 4/21/23 for Px  Last refill 11/30/23 90 0 refill  No future appointments.

## 2024-02-26 NOTE — TELEPHONE ENCOUNTER
Meloxicam 15 MG Oral Tab   rosuvastatin 10 MG Oral Tab   Send to [facility] on [address]   Brother will call back this afternoon to schedule px

## 2024-04-24 ENCOUNTER — OFFICE VISIT (OUTPATIENT)
Dept: FAMILY MEDICINE CLINIC | Facility: CLINIC | Age: 64
End: 2024-04-24
Payer: COMMERCIAL

## 2024-04-24 VITALS
HEART RATE: 69 BPM | TEMPERATURE: 98 F | HEIGHT: 68.5 IN | SYSTOLIC BLOOD PRESSURE: 118 MMHG | OXYGEN SATURATION: 97 % | DIASTOLIC BLOOD PRESSURE: 76 MMHG | BODY MASS INDEX: 30.11 KG/M2 | WEIGHT: 201 LBS

## 2024-04-24 DIAGNOSIS — M54.42 CHRONIC BILATERAL LOW BACK PAIN WITH BILATERAL SCIATICA: ICD-10-CM

## 2024-04-24 DIAGNOSIS — M54.6 CHRONIC BILATERAL THORACIC BACK PAIN: ICD-10-CM

## 2024-04-24 DIAGNOSIS — F84.0 AUTISM (HCC): ICD-10-CM

## 2024-04-24 DIAGNOSIS — E78.00 PURE HYPERCHOLESTEROLEMIA: ICD-10-CM

## 2024-04-24 DIAGNOSIS — M54.41 CHRONIC BILATERAL LOW BACK PAIN WITH BILATERAL SCIATICA: ICD-10-CM

## 2024-04-24 DIAGNOSIS — G89.29 CHRONIC BILATERAL THORACIC BACK PAIN: ICD-10-CM

## 2024-04-24 DIAGNOSIS — Z12.11 SCREEN FOR COLON CANCER: ICD-10-CM

## 2024-04-24 DIAGNOSIS — Z00.00 ENCOUNTER FOR ANNUAL HEALTH EXAMINATION: Primary | ICD-10-CM

## 2024-04-24 DIAGNOSIS — Z12.5 SCREENING FOR PROSTATE CANCER: ICD-10-CM

## 2024-04-24 DIAGNOSIS — G43.009 MIGRAINE WITHOUT AURA AND WITHOUT STATUS MIGRAINOSUS, NOT INTRACTABLE: ICD-10-CM

## 2024-04-24 DIAGNOSIS — G89.29 CHRONIC BILATERAL LOW BACK PAIN WITH BILATERAL SCIATICA: ICD-10-CM

## 2024-04-24 PROBLEM — H93.19 TINNITUS: Status: RESOLVED | Noted: 2023-04-20 | Resolved: 2024-04-24

## 2024-04-24 PROBLEM — R42 VERTIGO: Status: RESOLVED | Noted: 2023-04-20 | Resolved: 2024-04-24

## 2024-04-24 NOTE — PROGRESS NOTES
Subjective:   Félix Valero is a 64 year old male who presents for a Medicare Subsequent Annual Wellness visit (Pt already had Initial Annual Wellness) and scheduled follow up of multiple significant but stable problems.     Concerns: Back pain. The back pain is a chronic issue. Has pain in his whole back and feels sharp. It does radiate down his legs but denies bowel or bladder incontinence. Harder to get in and out of bed. Pain is impacting sleep. Wears back brace all day and removes it at night. Motrin helps somewhat as does hot showers. Has never had x-rays of his back done or PT for his back. Did do PT for vertigo.    Vertigo resolved.  Uses hearing aids. Ringing in ears resolved. Reports frequent wax build-up. Removes the earwax monthly with home kit.    Last colon cancer: FIT 4/2023    History/Other:   Fall Risk Assessment:   He has been screened for Falls and is low risk.      Cognitive Assessment:   He had a completely normal cognitive assessment - see flowsheet entries       Functional Ability/Status:   Félix Valero has some abnormal functions as listed below:  He has difficulties Managing Money/Bills based on screening of functional status.He has difficulties Taking Meds as Rx'd based on screening of functional status. He has Hearing problems based on screening of functional status.He has problems with Memory based on screening of functional status.       Depression Screening (PHQ-2/PHQ-9): PHQ-2 SCORE: 0  , done 4/24/2024        Advanced Directives:   He does have a Living Will but we do NOT have it on file in Epic.    He has a Power of  for Health Care on file in [facility].  Not discussed      Patient Active Problem List   Diagnosis    Migraine without aura and without status migrainosus, not intractable     Allergies:  He is allergic to hydrocodone-acetaminophen.    Current Medications:  Outpatient Medications Marked as Taking for the 4/24/24 encounter (Office Visit) with Alejandrina Peters APRN    Medication Sig    Meloxicam 15 MG Oral Tab Take 1 tablet (15 mg total) by mouth daily as needed for Pain.    rosuvastatin 10 MG Oral Tab Take 1 tablet (10 mg total) by mouth nightly.    divalproex  MG Oral Tablet 24 Hr Take 1 tablet (250 mg total) by mouth daily.    LORazepam 0.5 MG Oral Tab Take 1 tablet (0.5 mg total) by mouth every 6 (six) hours as needed (dizziness).    Ginger, Zingiber officinalis, (GINGER ROOT OR) Take by mouth.    Multiple Vitamins-Minerals (CENTRUM MEN OR) Take by mouth.     Medical History:  He  has a past medical history of Autism spectrum (HCC), Depression (2022), Hyperlipidemia, Migraines, Tinnitus (04/20/2023), and Vertigo (04/20/2023).  Surgical History:  He  has a past surgical history that includes hernia surgery and other surgical history.   Family History:  His family history includes Bone Cancer in his father; Pancreatic Cancer in his mother.  Social History:  He  reports that he has never smoked. He has never used smokeless tobacco. He reports current alcohol use of about 1.0 standard drink of alcohol per week. He reports that he does not use drugs.    Tobacco:  He has never smoked tobacco.    CAGE Alcohol Screen:   CAGE screening score of 0 on 4/24/2024, showing low risk of alcohol abuse.      Patient Care Team:  Leticia Gerber MD as PCP - General  Stephanie Martin PT as Physical Therapist  Conner Gatica MD as Consulting Physician (NEUROLOGY)    Review of Systems   Constitutional:  Negative for activity change, appetite change, chills, fatigue and unexpected weight change.   HENT:  Negative for congestion, ear pain, rhinorrhea and sinus pain.    Respiratory:  Negative for cough, chest tightness and shortness of breath.    Cardiovascular:  Negative for chest pain and palpitations.   Gastrointestinal:  Negative for abdominal distention, abdominal pain, blood in stool, constipation, diarrhea and nausea.   Endocrine: Negative for polydipsia and polyuria.   Genitourinary:   Negative for difficulty urinating, dysuria and hematuria.   Musculoskeletal:  Positive for back pain. Negative for gait problem.   Skin:  Negative for color change and rash.   Neurological:  Negative for dizziness, weakness, light-headedness, numbness and headaches.   Psychiatric/Behavioral:  Positive for sleep disturbance (due to back pain). Negative for dysphoric mood, self-injury and suicidal ideas. The patient is not nervous/anxious.      Objective:   Physical Exam  Constitutional:       General: He is not in acute distress.     Appearance: Normal appearance. He is obese. He is not ill-appearing.   Cardiovascular:      Rate and Rhythm: Normal rate and regular rhythm.      Pulses: Normal pulses.      Heart sounds: Normal heart sounds. No murmur heard.  Pulmonary:      Effort: Pulmonary effort is normal. No respiratory distress.      Breath sounds: Normal breath sounds. No wheezing.   Abdominal:      General: Bowel sounds are normal. There is no distension.      Palpations: Abdomen is soft.      Tenderness: There is no abdominal tenderness.   Neurological:      Mental Status: He is alert and oriented to person, place, and time. Mental status is at baseline.      Motor: No weakness.   Psychiatric:         Mood and Affect: Mood normal.         Behavior: Behavior normal.       /76   Pulse 69   Temp 97.8 °F (36.6 °C)   Ht 5' 8.5\" (1.74 m)   Wt 201 lb (91.2 kg)   SpO2 97%   BMI 30.12 kg/m²  Estimated body mass index is 30.12 kg/m² as calculated from the following:    Height as of this encounter: 5' 8.5\" (1.74 m).    Weight as of this encounter: 201 lb (91.2 kg).    Medicare Hearing Assessment:   Hearing Screening    Time taken: 4/24/2024  2:28 PM  Entry User: Heyde, Jordan, MA  Screening Method: Finger Rub  Finger Rub Result: Pass (Comment: hearing aids in both ears)       Assessment & Plan:   Félix Valero is a 64 year old male who presents for a Medicare Assessment.     1. Encounter for annual health  examination (Primary)  Comments:  will get Tdap at pharmacy  2. Screen for colon cancer  Comments:  FIT kit given  Orders:  -     Occult Blood, Fecal, FIT Immunoassay  3. Screening for prostate cancer  -     PSA Total, Diagnostic  4. Pure hypercholesterolemia  Comments:  check labs  5. Autism (HCC)  6. Migraine without aura and without status migrainosus, not intractable  Comments:  Following with neurology  7. Chronic bilateral low back pain with bilateral sciatica  Comments:  check xray and start PT  Orders:  -     XR LUMBAR SPINE (MIN 4 VIEWS) (CPT=72110); Future; Expected date: 04/24/2024  -     Physical Therapy Referral - [facility]  8. Chronic bilateral thoracic back pain  Comments:  check xray and start PT  Orders:  -     XR THORACIC SPINE (3 VIEWS) (CPT=72072); Future; Expected date: 04/24/2024  -     Physical Therapy Referral - [facility]  Other orders  -     TSH W Reflex To Free T4  -     Lipid Panel  -     Comp Metabolic Panel (14)  -     CBC With Differential With Platelet  The patient indicates understanding of these issues and agrees to the plan.  Imaging studies ordered.  Lab work ordered.  Reinforced healthy diet, lifestyle, and exercise.      Return in 1 year (on 4/24/2025).     Alejandrina Peters, APRN, 4/24/2024     Supplementary Documentation:   General Health:  In the past six months, have you lost more than 10 pounds without trying?: 2 - No  Has your appetite been poor?: No  Type of Diet: Balanced (watches calorie intake)  How does the patient maintain a good energy level?: Other;Daily Walks (helps out around the house, walks the dogs, outside work)  How would you describe your daily physical activity?: Moderate  How would you describe your current health state?: Good  How do you maintain positive mental well-being?: Visiting Family (drawing)  On a scale of 0 to 10, with 0 being no pain and 10 being severe pain, what is your pain level?: 10 - (Severe) (due to back pain)  In the past  six months, have you experienced urine leakage?: 0-No  At any time do you feel concerned for the safety/well-being of yourself and/or your children, in your home or elsewhere?: No  Have you had any immunizations at another office such as Influenza, Hepatitis B, Tetanus, or Pneumococcal?: No          Félix Valero's SCREENING SCHEDULE   Tests on this list are recommended by your physician but may not be covered, or covered at this frequency, by your insurer.   Please check with your insurance carrier before scheduling to verify coverage.   PREVENTATIVE SERVICES FREQUENCY &  COVERAGE DETAILS LAST COMPLETION DATE   Diabetes Screening    Fasting Blood Sugar / Glucose    One screening every 12 months if never tested or if previously tested but not diagnosed with pre-diabetes   One screening every 6 months if diagnosed with pre-diabetes Lab Results   Component Value Date    GLU 95 05/01/2023        Cardiovascular Disease Screening    Lipid Panel  Cholesterol  Lipoprotein (HDL)  Triglycerides Covered every 5 years for all Medicare beneficiaries without apparent signs or symptoms of cardiovascular disease No results found for: \"CHOLEST\", \"HDL\", \"LDL\", \"LDLD\", \"LDLC\", \"TRIG\"      Electrocardiogram (EKG)   Covered if needed at Welcome to Medicare, and non-screening if indicated for medical reasons 05/03/2023      Ultrasound Screening for Abdominal Aortic Aneurysm (AAA) Covered once in a lifetime for one of the following risk factors    Men who are 65-75 years old and have ever smoked    Anyone with a family history -     Colorectal Cancer Screening  Covered for ages 50-85; only need ONE of the following:    Colonoscopy   Covered every 10 years    Covered every 2 years if patient is at high risk or previous colonoscopy was abnormal -    Health Maintenance   Topic Date Due    Colorectal Cancer Screening  04/17/2024       Flexible Sigmoidoscopy   Covered every 4 years -    Fecal Occult Blood Test Covered annually -   Prostate  Cancer Screening    Prostate-Specific Antigen (PSA) Annually No results found for: \"PSA\"  Health Maintenance   Topic Date Due    PSA  Never done      Immunizations    Influenza Covered once per flu season  Please get every year -  No recommendations at this time    Pneumococcal Each vaccine (Tugkasq39 & Fawusybue38) covered once after 65 Prevnar 13: -    [id number]: -     No recommendations at this time    Hepatitis B One screening covered for patients with certain risk factors   -  No recommendations at this time    Tetanus Toxoid Not covered by Medicare Part B unless medically necessary (cut with metal); may be covered with your pharmacy prescription benefits -    Tetanus, Diptheria and Pertusis TD and TDaP Not covered by Medicare Part B -  No recommendations at this time    Zoster Not covered by Medicare Part B; may be covered with your pharmacy  prescription benefits -  No recommendations at this time     Annual Monitoring of Persistent Medications (ACE/ARB, digoxin diuretics, anticonvulsants)    Potassium Annually Lab Results   Component Value Date    K 3.9 05/01/2023         Creatinine   Annually Lab Results   Component Value Date    CREATSERUM 0.98 05/01/2023         BUN Annually Lab Results   Component Value Date    BUN 15 05/01/2023       Drug Serum Conc Annually No results found for: \"DIGOXIN\", \"DIG\", \"VALP\"            Cris ADAIR RN, APN student, documented in chart.  Patient discussed and examined with student NP. Agree with assessment and plan

## 2024-04-24 NOTE — PATIENT INSTRUCTIONS
Félix Valero's SCREENING SCHEDULE   Tests on this list are recommended by your physician but may not be covered, or covered at this frequency, by your insurer.   Please check with your insurance carrier before scheduling to verify coverage.   PREVENTATIVE SERVICES FREQUENCY &  COVERAGE DETAILS LAST COMPLETION DATE   Diabetes Screening    Fasting Blood Sugar / Glucose    One screening every 12 months if never tested or if previously tested but not diagnosed with pre-diabetes   One screening every 6 months if diagnosed with pre-diabetes Lab Results   Component Value Date    GLU 95 05/01/2023        Cardiovascular Disease Screening    Lipid Panel  Cholesterol  Lipoprotein (HDL)  Triglycerides Covered every 5 years for all Medicare beneficiaries without apparent signs or symptoms of cardiovascular disease No results found for: \"CHOLEST\", \"HDL\", \"LDL\", \"LDLD\", \"LDLC\", \"TRIG\"      Electrocardiogram (EKG)   Covered if needed at Welcome to Medicare, and non-screening if indicated for medical reasons 05/03/2023      Ultrasound Screening for Abdominal Aortic Aneurysm (AAA) Covered once in a lifetime for one of the following risk factors   • Men who are 65-75 years old and have ever smoked   • Anyone with a family history -     Colorectal Cancer Screening  Covered for ages 50-85; only need ONE of the following:    Colonoscopy   Covered every 10 years    Covered every 2 years if patient is at high risk or previous colonoscopy was abnormal -    Health Maintenance   Topic Date Due   • Colorectal Cancer Screening  04/17/2024       Flexible Sigmoidoscopy   Covered every 4 years -    Fecal Occult Blood Test Covered annually -   Prostate Cancer Screening    Prostate-Specific Antigen (PSA) Annually No results found for: \"PSA\"  Health Maintenance   Topic Date Due   • PSA  Never done      Immunizations    Influenza Covered once per flu season  Please get every year -  No recommendations at this time    Pneumococcal Each vaccine  (Peltiwu47 & Fmpwuvhab17) covered once after 65 Prevnar 13: -    [id number]: -     No recommendations at this time    Hepatitis B One screening covered for patients with certain risk factors   -  No recommendations at this time    Tetanus Toxoid Not covered by Medicare Part B unless medically necessary (cut with metal); may be covered with your pharmacy prescription benefits -    Tetanus, Diptheria and Pertusis TD and TDaP Not covered by Medicare Part B -  DTaP,Tdap,and Td Vaccines(1 - Tdap) Never done    Zoster Not covered by Medicare Part B; may be covered with your pharmacy  prescription benefits -  Zoster Vaccines(1 of 2) Never done     Annual Monitoring of Persistent Medications (ACE/ARB, digoxin diuretics, anticonvulsants)    Potassium Annually Lab Results   Component Value Date    K 3.9 05/01/2023         Creatinine   Annually Lab Results   Component Value Date    CREATSERUM 0.98 05/01/2023         BUN Annually Lab Results   Component Value Date    BUN 15 05/01/2023       Drug Serum Conc Annually No results found for: \"DIGOXIN\", \"DIG\", \"VALP\"

## 2024-04-25 ENCOUNTER — HOSPITAL ENCOUNTER (OUTPATIENT)
Dept: GENERAL RADIOLOGY | Age: 64
Discharge: HOME | End: 2024-04-25
Attending: NURSE PRACTITIONER
Payer: MEDICARE

## 2024-04-25 ENCOUNTER — LAB ENCOUNTER (OUTPATIENT)
Dept: LAB | Age: 64
End: 2024-04-25
Attending: NURSE PRACTITIONER
Payer: MEDICARE

## 2024-04-25 DIAGNOSIS — M54.42 CHRONIC BILATERAL LOW BACK PAIN WITH BILATERAL SCIATICA: ICD-10-CM

## 2024-04-25 DIAGNOSIS — M54.6 CHRONIC BILATERAL THORACIC BACK PAIN: ICD-10-CM

## 2024-04-25 DIAGNOSIS — G89.29 CHRONIC BILATERAL LOW BACK PAIN WITH BILATERAL SCIATICA: ICD-10-CM

## 2024-04-25 DIAGNOSIS — M54.41 CHRONIC BILATERAL LOW BACK PAIN WITH BILATERAL SCIATICA: ICD-10-CM

## 2024-04-25 DIAGNOSIS — G89.29 CHRONIC BILATERAL THORACIC BACK PAIN: ICD-10-CM

## 2024-04-25 LAB — HEMOCCULT STL QL: NEGATIVE

## 2024-04-25 PROCEDURE — 82274 ASSAY TEST FOR BLOOD FECAL: CPT | Performed by: NURSE PRACTITIONER

## 2024-04-25 PROCEDURE — 72110 X-RAY EXAM L-2 SPINE 4/>VWS: CPT | Performed by: NURSE PRACTITIONER

## 2024-04-25 PROCEDURE — 72072 X-RAY EXAM THORAC SPINE 3VWS: CPT | Performed by: NURSE PRACTITIONER

## 2024-04-26 ENCOUNTER — TELEPHONE (OUTPATIENT)
Dept: FAMILY MEDICINE CLINIC | Facility: CLINIC | Age: 64
End: 2024-04-26

## 2024-04-26 LAB
ABSOLUTE BASOPHILS: 50 CELLS/UL (ref 0–200)
ABSOLUTE EOSINOPHILS: 120 CELLS/UL (ref 15–500)
ABSOLUTE LYMPHOCYTES: 2142 CELLS/UL (ref 850–3900)
ABSOLUTE MONOCYTES: 460 CELLS/UL (ref 200–950)
ABSOLUTE NEUTROPHILS: 3528 CELLS/UL (ref 1500–7800)
ALBUMIN/GLOBULIN RATIO: 2 (CALC) (ref 1–2.5)
ALBUMIN: 4.3 G/DL (ref 3.6–5.1)
ALKALINE PHOSPHATASE: 53 U/L (ref 35–144)
ALT: 26 U/L (ref 9–46)
AST: 21 U/L (ref 10–35)
BASOPHILS: 0.8 %
BILIRUBIN, TOTAL: 0.5 MG/DL (ref 0.2–1.2)
BUN: 16 MG/DL (ref 7–25)
CALCIUM: 8.7 MG/DL (ref 8.6–10.3)
CARBON DIOXIDE: 29 MMOL/L (ref 20–32)
CHLORIDE: 102 MMOL/L (ref 98–110)
CHOL/HDLC RATIO: 3.7 (CALC)
CHOLESTEROL, TOTAL: 191 MG/DL
CREATININE: 0.85 MG/DL (ref 0.7–1.35)
EGFR: 98 ML/MIN/1.73M2
EOSINOPHILS: 1.9 %
GLOBULIN: 2.1 G/DL (CALC) (ref 1.9–3.7)
GLUCOSE: 79 MG/DL (ref 65–99)
HDL CHOLESTEROL: 52 MG/DL
HEMATOCRIT: 47.9 % (ref 38.5–50)
HEMOGLOBIN: 15.6 G/DL (ref 13.2–17.1)
LDL-CHOLESTEROL: 114 MG/DL (CALC)
LYMPHOCYTES: 34 %
MCH: 30 PG (ref 27–33)
MCHC: 32.6 G/DL (ref 32–36)
MCV: 92.1 FL (ref 80–100)
MONOCYTES: 7.3 %
MPV: 10.7 FL (ref 7.5–12.5)
NEUTROPHILS: 56 %
NON-HDL CHOLESTEROL: 139 MG/DL (CALC)
PLATELET COUNT: 215 THOUSAND/UL (ref 140–400)
POTASSIUM: 4.1 MMOL/L (ref 3.5–5.3)
PROTEIN, TOTAL: 6.4 G/DL (ref 6.1–8.1)
PSA, TOTAL: 2.73 NG/ML
RDW: 14 % (ref 11–15)
RED BLOOD CELL COUNT: 5.2 MILLION/UL (ref 4.2–5.8)
SODIUM: 139 MMOL/L (ref 135–146)
TRIGLYCERIDES: 139 MG/DL
TSH W/REFLEX TO FT4: 1.25 MIU/L (ref 0.4–4.5)
WHITE BLOOD CELL COUNT: 6.3 THOUSAND/UL (ref 3.8–10.8)

## 2024-04-26 NOTE — TELEPHONE ENCOUNTER
----- Message from OTIS Medrano sent at 4/26/2024  7:51 AM CDT -----  Mild lower spine degenerative changes  Start PT and if pain persisting, consider seeing orthopedic specialist

## 2024-04-26 NOTE — TELEPHONE ENCOUNTER
----- Message from OTIS Medrano sent at 4/26/2024  7:49 AM CDT -----  Thoracic spine appears normal  Starting PT may help with muscular pain

## 2024-04-29 ENCOUNTER — TELEPHONE (OUTPATIENT)
Dept: FAMILY MEDICINE CLINIC | Facility: CLINIC | Age: 64
End: 2024-04-29

## 2024-04-29 DIAGNOSIS — E78.00 PURE HYPERCHOLESTEROLEMIA: Primary | ICD-10-CM

## 2024-04-29 NOTE — TELEPHONE ENCOUNTER
----- Message from OTIS Medrano sent at 4/28/2024  4:26 PM CDT -----  Cholesterol stable, continue current dose of Crestor  FIT screening negative, repeat 1 year  Thyroid function normal  Chemistries normal  No anemia  PSA normal    Recheck lipid and CMP in 6 months

## 2024-05-12 RX ORDER — DIVALPROEX SODIUM 250 MG/1
250 TABLET, EXTENDED RELEASE ORAL DAILY
Qty: 90 TABLET | Refills: 3 | Status: CANCELLED | OUTPATIENT
Start: 2024-05-12

## 2024-05-13 RX ORDER — MELOXICAM 15 MG/1
15 TABLET ORAL DAILY PRN
Qty: 90 TABLET | Refills: 1 | Status: SHIPPED | OUTPATIENT
Start: 2024-05-13

## 2024-05-13 RX ORDER — ROSUVASTATIN CALCIUM 10 MG/1
10 TABLET, COATED ORAL NIGHTLY
Qty: 90 TABLET | Refills: 1 | Status: SHIPPED | OUTPATIENT
Start: 2024-05-13

## 2024-05-13 NOTE — TELEPHONE ENCOUNTER
Patient has refills at the pharmacy. Patient advised to reach out and request a refill.     Medication: Divalproex  mg     Date of last refill: 10/17/2023 (#90/3)  [place]     Last office visit: 10/17/2023  Due back to clinic per last office note:  9 months  Date next office visit scheduled:    No future appointments.        Last OV note recommendation:    Assessment:   He has improved significantly since last visit     Migraine   Vertigo  Tinnitus, unspecified laterality  SNHL     Plan:  Cont depakote low dose, refills sent  See orders and medications filed with this encounter. The patient indicates understanding of these issues and agrees with the plan.  Discussed with patient/family regarding assessment, care plan and possible adverse and side effects of the medications.   RTC 9-12 months  Pt should go ER for any new or worsening

## 2024-07-01 ENCOUNTER — PATIENT MESSAGE (OUTPATIENT)
Dept: FAMILY MEDICINE CLINIC | Facility: CLINIC | Age: 64
End: 2024-07-01

## 2024-07-01 DIAGNOSIS — M79.671 BILATERAL FOOT PAIN: ICD-10-CM

## 2024-07-01 DIAGNOSIS — M79.672 BILATERAL FOOT PAIN: ICD-10-CM

## 2024-07-01 DIAGNOSIS — K45.8 RECURRENT ABDOMINAL HERNIA WITHOUT OBSTRUCTION OR GANGRENE, UNSPECIFIED HERNIA TYPE: Primary | ICD-10-CM

## 2024-07-01 NOTE — TELEPHONE ENCOUNTER
Per Suly Hopkins: Kwadwo Fischer! [facility]. I have approved for tracking purposes only.

## 2024-07-01 NOTE — TELEPHONE ENCOUNTER
From: Félix Valero  To: Alejandrina Peters  Sent: 7/1/2024 1:24 PM CDT  Subject: Hernia and Feet issues    Hi Dr. Peters,    This Félix's brother Raudel and I am reaching out because of a couple of medical issues he is having right now.     Félix is pretty sure he has a hernia on his right side and we want to know if you can make a referral to a specialist for this condition and he did have hernia surgery many years ago in [place].    In regards to this feet, he wants a referral to a foot doctor that can give him cortizone shots for his feet as he has this many years ago and those seemed to help him with his pain and discomfort.    Please contact me for any questions.    Thanks  Raudel

## 2024-07-18 ENCOUNTER — OFFICE VISIT (OUTPATIENT)
Facility: LOCATION | Age: 64
End: 2024-07-18
Payer: COMMERCIAL

## 2024-07-18 VITALS
OXYGEN SATURATION: 97 % | HEIGHT: 68.5 IN | TEMPERATURE: 99 F | RESPIRATION RATE: 16 BRPM | BODY MASS INDEX: 29.52 KG/M2 | WEIGHT: 197 LBS | HEART RATE: 89 BPM

## 2024-07-18 DIAGNOSIS — K40.91 RECURRENT RIGHT INGUINAL HERNIA: Primary | ICD-10-CM

## 2024-07-18 PROCEDURE — 99205 OFFICE O/P NEW HI 60 MIN: CPT | Performed by: STUDENT IN AN ORGANIZED HEALTH CARE EDUCATION/TRAINING PROGRAM

## 2024-07-18 PROCEDURE — 3008F BODY MASS INDEX DOCD: CPT | Performed by: STUDENT IN AN ORGANIZED HEALTH CARE EDUCATION/TRAINING PROGRAM

## 2024-07-18 NOTE — H&P
New Patient Visit Note       Active Problems      1. Recurrent right inguinal hernia        Chief Complaint   Chief Complaint   Patient presents with    Abdominal Pain     New patient c/o recurrent lower pelvic pain on right x 2 months hx hernia repair in 1998       History of Present Illness   64 year old male who is here for evaluation of right inguinal hernia. He reports noted a painful bulge in the right groin 2 months ago. Pain is intermittent and worse with movement and when lying on his right side. He denies nausea or vomiting, constipation or diarrhea, fevers or chills. He reports the bulge has grown in size. He reports prior open right inguinal hernia repair in 1998. He has no symptoms on the left side.       Allergies  Félix is allergic to hydrocodone-acetaminophen.    Past Medical / Surgical / Social / Family History    The past medical and past surgical history have been reviewed by me today.    Past Medical History:    Autism spectrum (HCC)    Depression    Hyperlipidemia    Migraines    Tinnitus    Vertigo     Past Surgical History:   Procedure Laterality Date    Hernia surgery      Other surgical history         The family history and social history have been reviewed by me today.    Family History   Problem Relation Age of Onset    Pancreatic Cancer Mother     Bone Cancer Father      Social History     Socioeconomic History    Marital status: Single   Tobacco Use    Smoking status: Never    Smokeless tobacco: Never   Substance and Sexual Activity    Alcohol use: Yes     Alcohol/week: 1.0 standard drink of alcohol     Types: 1 Glasses of wine per week    Drug use: Never   Other Topics Concern    Caffeine Concern No    Exercise No        Current Outpatient Medications:     Meloxicam 15 MG Oral Tab, Take 1 tablet (15 mg total) by mouth daily as needed for Pain., Disp: 90 tablet, Rfl: 1    rosuvastatin 10 MG Oral Tab, Take 1 tablet (10 mg total) by mouth nightly., Disp: 90 tablet, Rfl: 1    divalproex ER  250 MG Oral Tablet 24 Hr, Take 1 tablet (250 mg total) by mouth daily., Disp: 90 tablet, Rfl: 3    LORazepam 0.5 MG Oral Tab, Take 1 tablet (0.5 mg total) by mouth every 6 (six) hours as needed (dizziness)., Disp: 20 tablet, Rfl: 0    Ginger, Zingiber officinalis, (GINGER ROOT OR), Take by mouth., Disp: , Rfl:     Multiple Vitamins-Minerals (CENTRUM MEN OR), Take by mouth., Disp: , Rfl:       Review of Systems  The Review of Systems has been reviewed by me during today.  Review of Systems   Constitutional:  Negative for chills, diaphoresis, fatigue and fever.   HENT:  Negative for ear discharge, ear pain and sore throat.    Eyes:  Negative for pain and discharge.   Respiratory:  Negative for cough, chest tightness and shortness of breath.    Cardiovascular:  Negative for chest pain, palpitations and leg swelling.   Gastrointestinal:  Negative for abdominal distention, abdominal pain, blood in stool, constipation, diarrhea, nausea and vomiting.        Right groin pain and bulge   Genitourinary:  Negative for dysuria, frequency, hematuria and urgency.   Skin:  Negative for color change, pallor and rash.   Neurological:  Negative for weakness, light-headedness, numbness and headaches.   Hematological:  Negative for adenopathy. Does not bruise/bleed easily.   Psychiatric/Behavioral:  Negative for agitation and confusion.        Physical Findings   Pulse 89   Temp 98.5 °F (36.9 °C)   Resp 16   Ht 68.5\"   Wt 197 lb (89.4 kg)   SpO2 97%   BMI 29.52 kg/m²   Physical Exam  Abdominal:      General: Abdomen is flat. There is no distension.      Palpations: Abdomen is soft.      Tenderness: There is no abdominal tenderness.      Hernia: A hernia is present. Hernia is present in the right inguinal area.          Comments: Right large inguinal hernia, tender to the touch, reducible             Assessment/Plan  1. Recurrent right inguinal hernia        Félix Valero is a 64 year old male referred by Leticia Gerber MD for  evaluation of right inguinal hernia. He has  large symptomatic recurrent and reducible right inguinal hernia. He has had an open repair in the past that failed after 26 years. I dicussed with him the treatment options and the risks and benefits of all treatment modalities. Will proceed with robotic right inguinal hernia. Discussed with him the risks and benefits and the post operative recovery . Patient agreeable to proceed with robotic right inguinal hernia repair. All questions were answered in detail.      Kari Pepe MD

## 2024-07-29 ENCOUNTER — TELEPHONE (OUTPATIENT)
Facility: LOCATION | Age: 64
End: 2024-07-29

## 2024-07-29 DIAGNOSIS — K40.90 UNILATERAL INGUINAL HERNIA WITHOUT OBSTRUCTION OR GANGRENE, RECURRENCE NOT SPECIFIED: Primary | ICD-10-CM

## 2024-09-13 ENCOUNTER — TELEPHONE (OUTPATIENT)
Facility: LOCATION | Age: 64
End: 2024-09-13

## 2024-09-13 NOTE — TELEPHONE ENCOUNTER
Notification or Prior Authorization is not required for the requested services  You are not required to submit a notification/prior authorization based on the information provided. If you have general questions about the prior authorization requirements, visit [employer] > Clinician Resources > Advance and Admission Notification Requirements. The number above acknowledges your notification. Please write this reference number down for future reference. If you would like to request an organization determination, please call us at [phone].  Decision ID #: [id number]  The number above acknowledges your inquiry and our response. Please write this number down and refer to it for future inquiries. Coverage and payment for an item or service is governed by the member's benefit plan document, and, if applicable, the provider's participation agreement with the Health Plan.  Patient details    Patient name  REAGAN GAMBINO  Member number  [id number]  Group number  [id number]  Product  PPO - Options PPO  Relationship  Employee  Effective date  01/01/2024  Termination date  12/31/2024  Insurance type  Medicare  Verbal language preference  English  Written language preference  English  A future timeline may be available for this member. For future coverage please call the telephone number located on the back of the member's Medical ID card.  Admitting/attending physician details    Name  Kari Pepe  Tax ID number  [id number]  Address  [address] [place] [address]  Status  In network  Service details    Place of service  Outpatient Facility[facility]  What is place of service?  Service details  Surgical  Facility details    Name  [facility]  Tax ID number  [id number]  Address  [address], [place] [address] [phone]  Status  In network  Facility service dates details    Start date  09/27/2024  End date  12/26/2024

## 2024-09-27 ENCOUNTER — HOSPITAL ENCOUNTER (OUTPATIENT)
Facility: HOSPITAL | Age: 64
Setting detail: HOSPITAL OUTPATIENT SURGERY
Discharge: HOME | End: 2024-09-27
Attending: STUDENT IN AN ORGANIZED HEALTH CARE EDUCATION/TRAINING PROGRAM | Admitting: STUDENT IN AN ORGANIZED HEALTH CARE EDUCATION/TRAINING PROGRAM
Payer: MEDICARE

## 2024-09-27 ENCOUNTER — ANESTHESIA (OUTPATIENT)
Dept: SURGERY | Facility: HOSPITAL | Age: 64
End: 2024-09-27
Payer: MEDICARE

## 2024-09-27 ENCOUNTER — ANESTHESIA EVENT (OUTPATIENT)
Dept: SURGERY | Facility: HOSPITAL | Age: 64
End: 2024-09-27
Payer: MEDICARE

## 2024-09-27 VITALS
TEMPERATURE: 98 F | SYSTOLIC BLOOD PRESSURE: 134 MMHG | HEART RATE: 68 BPM | DIASTOLIC BLOOD PRESSURE: 53 MMHG | OXYGEN SATURATION: 94 % | HEIGHT: 68.5 IN | WEIGHT: 196 LBS | BODY MASS INDEX: 29.36 KG/M2 | RESPIRATION RATE: 18 BRPM

## 2024-09-27 DIAGNOSIS — Z98.890 S/P INGUINAL HERNIA REPAIR: Primary | ICD-10-CM

## 2024-09-27 DIAGNOSIS — Z87.19 S/P INGUINAL HERNIA REPAIR: Primary | ICD-10-CM

## 2024-09-27 PROCEDURE — 8E0W4CZ ROBOTIC ASSISTED PROCEDURE OF TRUNK REGION, PERCUTANEOUS ENDOSCOPIC APPROACH: ICD-10-PCS | Performed by: STUDENT IN AN ORGANIZED HEALTH CARE EDUCATION/TRAINING PROGRAM

## 2024-09-27 PROCEDURE — 0YU54JZ SUPPLEMENT RIGHT INGUINAL REGION WITH SYNTHETIC SUBSTITUTE, PERCUTANEOUS ENDOSCOPIC APPROACH: ICD-10-PCS | Performed by: STUDENT IN AN ORGANIZED HEALTH CARE EDUCATION/TRAINING PROGRAM

## 2024-09-27 DEVICE — LAPAROSCOPIC SELF-FIXATING MESH POLYESTER WITH POLYLACTIC ACID GRIPS AND COLLAGEN FILM
Type: IMPLANTABLE DEVICE | Site: ABDOMEN | Status: FUNCTIONAL
Brand: PROGRIP

## 2024-09-27 RX ORDER — DEXAMETHASONE SODIUM PHOSPHATE 4 MG/ML
VIAL (ML) INJECTION AS NEEDED
Status: DISCONTINUED | OUTPATIENT
Start: 2024-09-27 | End: 2024-09-27 | Stop reason: SURG

## 2024-09-27 RX ORDER — NALOXONE HYDROCHLORIDE 0.4 MG/ML
0.08 INJECTION, SOLUTION INTRAMUSCULAR; INTRAVENOUS; SUBCUTANEOUS AS NEEDED
Status: DISCONTINUED | OUTPATIENT
Start: 2024-09-27 | End: 2024-09-27

## 2024-09-27 RX ORDER — HEPARIN SODIUM 5000 [USP'U]/ML
5000 INJECTION, SOLUTION INTRAVENOUS; SUBCUTANEOUS ONCE
Status: COMPLETED | OUTPATIENT
Start: 2024-09-27 | End: 2024-09-27

## 2024-09-27 RX ORDER — KETOROLAC TROMETHAMINE 30 MG/ML
INJECTION, SOLUTION INTRAMUSCULAR; INTRAVENOUS AS NEEDED
Status: DISCONTINUED | OUTPATIENT
Start: 2024-09-27 | End: 2024-09-27 | Stop reason: SURG

## 2024-09-27 RX ORDER — TRAMADOL HYDROCHLORIDE 50 MG/1
100 TABLET ORAL ONCE AS NEEDED
Status: DISCONTINUED | OUTPATIENT
Start: 2024-09-27 | End: 2024-09-27

## 2024-09-27 RX ORDER — HYDROMORPHONE HYDROCHLORIDE 1 MG/ML
0.6 INJECTION, SOLUTION INTRAMUSCULAR; INTRAVENOUS; SUBCUTANEOUS EVERY 5 MIN PRN
Status: DISCONTINUED | OUTPATIENT
Start: 2024-09-27 | End: 2024-09-27

## 2024-09-27 RX ORDER — OXYCODONE HYDROCHLORIDE 5 MG/1
5 TABLET ORAL EVERY 4 HOURS PRN
Qty: 15 TABLET | Refills: 0 | Status: SHIPPED | OUTPATIENT
Start: 2024-09-27

## 2024-09-27 RX ORDER — HYDROCODONE BITARTRATE AND ACETAMINOPHEN 5; 325 MG/1; MG/1
2 TABLET ORAL ONCE AS NEEDED
Status: DISCONTINUED | OUTPATIENT
Start: 2024-09-27 | End: 2024-09-27

## 2024-09-27 RX ORDER — ACETAMINOPHEN 500 MG
1000 TABLET ORAL ONCE AS NEEDED
Status: DISCONTINUED | OUTPATIENT
Start: 2024-09-27 | End: 2024-09-27

## 2024-09-27 RX ORDER — SODIUM CHLORIDE, SODIUM LACTATE, POTASSIUM CHLORIDE, CALCIUM CHLORIDE 600; 310; 30; 20 MG/100ML; MG/100ML; MG/100ML; MG/100ML
INJECTION, SOLUTION INTRAVENOUS CONTINUOUS
Status: DISCONTINUED | OUTPATIENT
Start: 2024-09-27 | End: 2024-09-27

## 2024-09-27 RX ORDER — TRAMADOL HYDROCHLORIDE 50 MG/1
50 TABLET ORAL ONCE AS NEEDED
Status: DISCONTINUED | OUTPATIENT
Start: 2024-09-27 | End: 2024-09-27

## 2024-09-27 RX ORDER — NEOSTIGMINE METHYLSULFATE 1 MG/ML
INJECTION INTRAVENOUS AS NEEDED
Status: DISCONTINUED | OUTPATIENT
Start: 2024-09-27 | End: 2024-09-27 | Stop reason: SURG

## 2024-09-27 RX ORDER — HYDROMORPHONE HYDROCHLORIDE 1 MG/ML
0.2 INJECTION, SOLUTION INTRAMUSCULAR; INTRAVENOUS; SUBCUTANEOUS EVERY 5 MIN PRN
Status: DISCONTINUED | OUTPATIENT
Start: 2024-09-27 | End: 2024-09-27

## 2024-09-27 RX ORDER — HYDROCODONE BITARTRATE AND ACETAMINOPHEN 5; 325 MG/1; MG/1
1 TABLET ORAL ONCE AS NEEDED
Status: DISCONTINUED | OUTPATIENT
Start: 2024-09-27 | End: 2024-09-27

## 2024-09-27 RX ORDER — GLYCOPYRROLATE 0.2 MG/ML
INJECTION, SOLUTION INTRAMUSCULAR; INTRAVENOUS AS NEEDED
Status: DISCONTINUED | OUTPATIENT
Start: 2024-09-27 | End: 2024-09-27 | Stop reason: SURG

## 2024-09-27 RX ORDER — LIDOCAINE HYDROCHLORIDE 10 MG/ML
INJECTION, SOLUTION EPIDURAL; INFILTRATION; INTRACAUDAL; PERINEURAL AS NEEDED
Status: DISCONTINUED | OUTPATIENT
Start: 2024-09-27 | End: 2024-09-27 | Stop reason: SURG

## 2024-09-27 RX ORDER — ROCURONIUM BROMIDE 10 MG/ML
INJECTION, SOLUTION INTRAVENOUS AS NEEDED
Status: DISCONTINUED | OUTPATIENT
Start: 2024-09-27 | End: 2024-09-27 | Stop reason: SURG

## 2024-09-27 RX ORDER — SCOLOPAMINE TRANSDERMAL SYSTEM 1 MG/1
1 PATCH, EXTENDED RELEASE TRANSDERMAL ONCE
Status: DISCONTINUED | OUTPATIENT
Start: 2024-09-27 | End: 2024-09-27

## 2024-09-27 RX ORDER — BUPIVACAINE HYDROCHLORIDE 2.5 MG/ML
INJECTION, SOLUTION EPIDURAL; INFILTRATION; INTRACAUDAL AS NEEDED
Status: DISCONTINUED | OUTPATIENT
Start: 2024-09-27 | End: 2024-09-27 | Stop reason: HOSPADM

## 2024-09-27 RX ORDER — ONDANSETRON 2 MG/ML
INJECTION INTRAMUSCULAR; INTRAVENOUS AS NEEDED
Status: DISCONTINUED | OUTPATIENT
Start: 2024-09-27 | End: 2024-09-27 | Stop reason: SURG

## 2024-09-27 RX ORDER — HYDROMORPHONE HYDROCHLORIDE 1 MG/ML
0.4 INJECTION, SOLUTION INTRAMUSCULAR; INTRAVENOUS; SUBCUTANEOUS EVERY 5 MIN PRN
Status: DISCONTINUED | OUTPATIENT
Start: 2024-09-27 | End: 2024-09-27

## 2024-09-27 RX ORDER — ACETAMINOPHEN 500 MG
1000 TABLET ORAL ONCE
Status: DISCONTINUED | OUTPATIENT
Start: 2024-09-27 | End: 2024-09-27 | Stop reason: HOSPADM

## 2024-09-27 RX ORDER — PROCHLORPERAZINE EDISYLATE 5 MG/ML
5 INJECTION INTRAMUSCULAR; INTRAVENOUS EVERY 8 HOURS PRN
Status: DISCONTINUED | OUTPATIENT
Start: 2024-09-27 | End: 2024-09-27

## 2024-09-27 RX ORDER — ONDANSETRON 2 MG/ML
4 INJECTION INTRAMUSCULAR; INTRAVENOUS EVERY 6 HOURS PRN
Status: DISCONTINUED | OUTPATIENT
Start: 2024-09-27 | End: 2024-09-27

## 2024-09-27 RX ADMIN — SODIUM CHLORIDE, SODIUM LACTATE, POTASSIUM CHLORIDE, CALCIUM CHLORIDE: 600; 310; 30; 20 INJECTION, SOLUTION INTRAVENOUS at 16:14:00

## 2024-09-27 RX ADMIN — ONDANSETRON 4 MG: 2 INJECTION INTRAMUSCULAR; INTRAVENOUS at 14:51:00

## 2024-09-27 RX ADMIN — KETOROLAC TROMETHAMINE 30 MG: 30 INJECTION, SOLUTION INTRAMUSCULAR; INTRAVENOUS at 16:16:00

## 2024-09-27 RX ADMIN — DEXAMETHASONE SODIUM PHOSPHATE 8 MG: 4 MG/ML VIAL (ML) INJECTION at 14:51:00

## 2024-09-27 RX ADMIN — NEOSTIGMINE METHYLSULFATE 3 MG: 1 INJECTION INTRAVENOUS at 16:13:00

## 2024-09-27 RX ADMIN — LIDOCAINE HYDROCHLORIDE 50 MG: 10 INJECTION, SOLUTION EPIDURAL; INFILTRATION; INTRACAUDAL; PERINEURAL at 14:33:00

## 2024-09-27 RX ADMIN — ROCURONIUM BROMIDE 50 MG: 10 INJECTION, SOLUTION INTRAVENOUS at 14:33:00

## 2024-09-27 RX ADMIN — GLYCOPYRROLATE 0.2 MG: 0.2 INJECTION, SOLUTION INTRAMUSCULAR; INTRAVENOUS at 14:51:00

## 2024-09-27 RX ADMIN — GLYCOPYRROLATE 0.4 MG: 0.2 INJECTION, SOLUTION INTRAMUSCULAR; INTRAVENOUS at 16:13:00

## 2024-09-27 NOTE — ANESTHESIA PREPROCEDURE EVALUATION
PRE-OP EVALUATION    Patient Name: Félix Valero    Admit Diagnosis: Unilateral inguinal hernia without obstruction or gangrene, recurrence not specified [K40.90]    Pre-op Diagnosis: Unilateral inguinal hernia without obstruction or gangrene, recurrence not specified [K40.90]    ROBOTIC  LAPAROSCOPIC RIGHT  INGUINAL HERNIA REPAIR    Anesthesia Procedure: ROBOTIC  LAPAROSCOPIC RIGHT  INGUINAL HERNIA REPAIR (Right)    Surgeons and Role:     * Kari Pepe MD - Primary    Pre-op vitals reviewed.  Temp: 97.1 °F (36.2 °C)  Pulse: 73  Resp: 16  BP: 140/76  SpO2: 100 %  Body mass index is 29.37 kg/m².    Current medications reviewed.  Hospital Medications:   [Transfer Hold] acetaminophen (Tylenol Extra Strength) tab 1,000 mg  1,000 mg Oral Once    scopolamine (Transderm-Scop) 1 MG/3DAYS patch 1 patch  1 patch Transdermal Once    lactated ringers infusion   Intravenous Continuous    [COMPLETED] heparin (Porcine) 5000 UNIT/ML injection 5,000 Units  5,000 Units Subcutaneous Once    ceFAZolin (Ancef) 2g in 10mL IV syringe premix  2 g Intravenous Once       Outpatient Medications:     Medications Prior to Admission   Medication Sig Dispense Refill Last Dose    Meloxicam 15 MG Oral Tab Take 1 tablet (15 mg total) by mouth daily as needed for Pain. 90 tablet 1 9/20/2024    rosuvastatin 10 MG Oral Tab Take 1 tablet (10 mg total) by mouth nightly. 90 tablet 1 9/26/2024    divalproex  MG Oral Tablet 24 Hr Take 1 tablet (250 mg total) by mouth daily. 90 tablet 3 9/27/2024    Ginger, Zingiber officinalis, (GINGER ROOT OR) Take by mouth.   9/13/2024    Multiple Vitamins-Minerals (CENTRUM MEN OR) Take by mouth.   9/27/2024       Allergies: Hydrocodone and Propoxyphene      Anesthesia Evaluation        Anesthetic Complications  (+) history of anesthetic complications         GI/Hepatic/Renal    Negative GI/hepatic/renal ROS.                        Irritable bowel syndrome: .prob.     Cardiovascular                     (+)  hyperlipidemia                                  Endo/Other    Negative endo/other ROS.                              Pulmonary    Negative pulmonary ROS.                (-) sleep apnea       Neuro/Psych      (+) depression             (+) headaches  (+) psychiatric history         Patient Active Problem List:     Migraine without aura and without status migrainosus, not intractable    autisms        Past Surgical History:   Procedure Laterality Date    Adenoidectomy      x2    Hernia surgery      Other surgical history      Tonsillectomy       Social History     Socioeconomic History    Marital status: Single   Tobacco Use    Smoking status: Never    Smokeless tobacco: Never   Vaping Use    Vaping status: Never Used   Substance and Sexual Activity    Alcohol use: Yes     Alcohol/week: 1.0 standard drink of alcohol     Types: 1 Glasses of wine per week    Drug use: Never   Other Topics Concern    Caffeine Concern No    Exercise No     History   Drug Use Unknown     Available pre-op labs reviewed.               Airway      Mallampati: I  Mouth opening: <3 FB  TM distance: < 4 cm   Cardiovascular    Cardiovascular exam normal.         Dental  Comment: Patient does not endorse any loose teeth. Cannot exclude any vulnerable teeth given limitations of exam. Will reassess post induction and prior to any airway manipulation.                Pulmonary    Pulmonary exam normal.                 Other findings              ASA: 3   Plan: general  NPO status verified and     Post-procedure pain management plan discussed with surgeon and patient.    Comment: I discussed the rare but serious complications of anesthesia, including but not limited to cardiovascular complications, allergic reaction to medications, pulomary/respiratory complications, post-operative nausea, post-op pain, damage to dentition, aspiration, or sore throat. The patient expressed understanding of plan and agreement.     Plan/risks discussed with: patient and  sibling                Present on Admission:  **None**

## 2024-09-27 NOTE — DISCHARGE INSTRUCTIONS
Home Care Instructions  Robot-Assisted Laparoscopic Inguinal Hernia Repair  Mercedes Saha PA-C          WHAT TO EXPECT  You may feel pain at the incisions or where your hernia used to be. This is due to stitches placed during the surgery.    You may feel pain and bruising in the groin. You may notice swelling of the scrotum. This is common and will resolve.    You may feel pain in the shoulders. This is due to irritation of the diaphragm by the air used to inflate the abdomen.    You may feel a sore throat. This is due to the breathing tube used during surgery.     You may feel mild nausea and vomiting for the first 24 hours, this should resolve quickly.    You may have constipation, especially if taking narcotic pain medications. If you have not had a bowel movement by 48 hours after surgery, take Miralax 17g (one cap full) every 12 hours until you have a bowel movement. If another 24 hours goes by without a bowel movement, then take a dose of magnesium citrate or milk of magnesia.     MEDICATIONS  Take 2 Extra Strength Tylenol (1000mg every) 8 hours for pain. For the first 3 days it is best to take the Tylenol every 8 hours even if you do not feel much pain.     Take Advil (ibuprofen) 800mg every 8 hours or Alleve (naproxen) 500mg every 12 hours, also for the first 3 days.     For moderate to severe pain take one Oxycodone pill (5mg) every six hours as needed for pain. If you do not feel that narcotics are necessary you shouldn’t take them. If the pain is severe you can take two pills (10mg) every six hours.    Please ask your surgeon before resuming blood thinners such as Aspirin, Plavix, Coumadin, Warfarin, Eliquis, or Xarelto. All other home medications may be resumed as scheduled.     DIET  Start with a light and bland diet and slowly advance to regular food as your appetite improves. There are no specific food restrictions. Do not eat excessively. Eat small frequent meals.     Drink plenty of water. Try to  eat a healthy high fiber diet.    Do not drink alcohol (beer, wine, liquor) or use tobacco products.    WOUND CARE  The incisions are covered with Skin Glue. You can shower 24 hours after surgery and get the dressings wet.    The Skin Glue will stay on for 10 to 14 days after surgery.     Soap and water can get on the incisions but do not scrub the wounds. No hair dye or chemicals of any kind should get on the incisions.     Do not apply any topical ointments such as Neosporin or Hydrogen Peroxide.    Do not swim or submerge the incisions under water for 1 month.    ACTIVITY  Every day you should be up walking around the house. Do not lie in bed all day. Staying active prevents blood clots and pneumonia.    You can go up and down stairs. Do not lift more than 20 pounds or perform strenuous activity that requires straining the core muscles.    You may ride in a car but should not drive the car for at least one week.     APPOINTMENT  Please call our office at [phone] soon to make an appointment.    For questions or concerns please call our office between 8:30 a.m. and 5 p.m. Monday through Friday. The number above directs to the answering service after hours to reach the on-call physician.    Please call our office immediately for fever greater than 100.5, excess bleeding, inability to urinate, severe abdominal pain, severe diarrhea, uncontrollable vomiting.      For life threatening emergencies such as severe chest pain, difficulty breathing, or loss of conciousness call 911.

## 2024-09-27 NOTE — OPERATIVE REPORT
[facility]  Operative Note    Félix Valero Location: OR   [facility] [id number] MRN [id number]    1960 Age 64 year old   Admission Date 2024 Operation Date 2024   Attending Physician Kari Pepe MD Operating Physician Kari Pepe MD   PCP Alejandrina Peters          Patient Name: Félix Valero    Preoperative Diagnosis: Unilateral inguinal hernia without obstruction or gangrene, recurrence not specified [K40.90]    Postoperative Diagnosis: Same as pre-op diagnosis.    Primary Surgeon: Kari Pepe MD     Assistant: BRANDIN Bernal     Anesthesia: General    Anesthesiologist: Anesthesiologist.: Cami Rebollar MD    Procedures: Robotic right inguinal hernia repair    Implants: Covidien 12 cm x 16 cm Progrip mesh     Specimen: none     Drains: None    Estimated Blood Loss: Blood Output: 5 mL (2024  4:16 PM)      Complications: None    Condition: Good    Indications for Surgery:   Félix Valero is a 64 year old gentleman who presents with a painful enlarging bulge in the right  groin. Physical exam right inguinal hernia. This is a recurrent hernia as he had a prior right open inguinal hernia repair in . The patient presents today for elective hernia repair.    Surgical Findings:   Direct and indirect right inguinal hernia     Description of Procedure:   The patient was transported to the operating room and placed on the operating table in supine position. General endotracheal anesthesia was administered. The abdomen and groins were clipped, prepped and draped in sterile fashion. Pre-operative antibiotics were given. A time-out was performed.      A transverse 8-mm supraumbilical incision was made. A Veress needle was inserted. Pneumoperitoneum was achieved to a pressure of 15mm Hg.  A 8mm trocar was placed and a laparoscope inserted. Diagnostic survey of the abdomen revealed no other acute pathology or iatrogenic injury. The inguinal region was examined and the hernia  defect was identified. Two 8-mm trocars were placed on either side of the abdomen in the midclavicular line under direct vision. The patient was placed in Trendelenburg position. The robot was then docked and instruments placed under direct vision.     The hernia contents were carefully reduced into the abdomen. The peritoneum was grasped, retracted, and incised approximately 5cm cephalad to the hernia defect. Blunt and sharp dissection were used with judicious electrocautery to create the pre-peritoneal pocket. Medial to lateral dissection was performed. We identifed initially the pubic tubercle and Kevin's ligament. Dissection was carried out to a level posterior to the pubic tubercle and across the midline. The soft tissue was dissected medial to the iliac vein clear the femoral space. Then the soft tissues were dissected laterally to create a large pre-peritoneal pocket to accommodate a mesh. Care was taken not to injure the femoral vessels, the epigastric vessels, or any nerve structures during dissection.    Blunt and sharp dissection were used to dissect the hernia sac away from the spermatic cord. Care was taken to not injure the vas deferens or the spermatic vessels. The peritoneal flap was dissected back sufficiently so that retraction on the flap did not manipulate the spermatic cord structures. A cord lipoma was identified and dissected away from the spermatic cord. A Covidien ProGrip 12 cm x 16 cm mesh was placed in our dissected pocket and we ensured adequate coverage of all potential hernia sites. The direct inguinal hernia sac was imbricated and sutured to kevin's ligament using a 2-0 V Loc suture to close that defect and prevent ballooning of the mesh into it. A 3-0 Vicryl suture was used to affix the medial aspect of the mesh to the pubic tubercle, and the superior aspect to the rectus abdominis muscle. The peritoneal flap was then reapproximated using running 2-0 V-Loc suture.       All  instruments were removed and the robot undocked. All trocars were removed under direct visualization and pneumoperitoneum was released.  All wounds were cleansed and irrigated. The skin incisions were reapproximated using subcuticular 4-0 Monocryl suture. Local anesthetic was injected.  Skin glue was used to seal the incisions. The scrotum was palpated and the testicles were retracted back into their anatomic position.    The patient's drapes were removed and the patient was awakened from anesthesia. They were then transported to the recovery room in stable condition. The patient tolerated the procedure well without apparent complication. All needle, sponge, instrument counts correct at the end of procedure.      Kari Pepe MD   9/27/2024  4:17 PM

## 2024-09-27 NOTE — H&P
New Patient Visit Note     Active Problems      1. Recurrent right inguinal hernia          Chief Complaint        Chief Complaint   Patient presents with    Abdominal Pain       New patient c/o recurrent lower pelvic pain on right x 2 months hx hernia repair in 1998         History of Present Illness   64 year old male who is here for evaluation of right inguinal hernia. He reports noted a painful bulge in the right groin 2 months ago. Pain is intermittent and worse with movement and when lying on his right side. He denies nausea or vomiting, constipation or diarrhea, fevers or chills. He reports the bulge has grown in size. He reports prior open right inguinal hernia repair in 1998. He has no symptoms on the left side.         Allergies  Félix is allergic to hydrocodone-acetaminophen.     Past Medical / Surgical / Social / Family History    The past medical and past surgical history have been reviewed by me today.     Past Medical History       Past Medical History:    Autism spectrum (HCC)    Depression    Hyperlipidemia    Migraines    Tinnitus    Vertigo         Past Surgical History         Past Surgical History:   Procedure Laterality Date    Hernia surgery        Other surgical history                The family history and social history have been reviewed by me today.     Family History         Family History   Problem Relation Age of Onset    Pancreatic Cancer Mother      Bone Cancer Father           Social Hx on file   Social History            Socioeconomic History    Marital status: Single   Tobacco Use    Smoking status: Never    Smokeless tobacco: Never   Substance and Sexual Activity    Alcohol use: Yes       Alcohol/week: 1.0 standard drink of alcohol       Types: 1 Glasses of wine per week    Drug use: Never   Other Topics Concern    Caffeine Concern No    Exercise No           Medications - Current      Current Outpatient Medications:     Meloxicam 15 MG Oral Tab, Take 1 tablet (15 mg total) by  mouth daily as needed for Pain., Disp: 90 tablet, Rfl: 1    rosuvastatin 10 MG Oral Tab, Take 1 tablet (10 mg total) by mouth nightly., Disp: 90 tablet, Rfl: 1    divalproex  MG Oral Tablet 24 Hr, Take 1 tablet (250 mg total) by mouth daily., Disp: 90 tablet, Rfl: 3    LORazepam 0.5 MG Oral Tab, Take 1 tablet (0.5 mg total) by mouth every 6 (six) hours as needed (dizziness)., Disp: 20 tablet, Rfl: 0    Ginger, Zingiber officinalis, (GINGER ROOT OR), Take by mouth., Disp: , Rfl:     Multiple Vitamins-Minerals (CENTRUM MEN OR), Take by mouth., Disp: , Rfl:          Review of Systems  The Review of Systems has been reviewed by me during today.  Review of Systems   Constitutional:  Negative for chills, diaphoresis, fatigue and fever.   HENT:  Negative for ear discharge, ear pain and sore throat.    Eyes:  Negative for pain and discharge.   Respiratory:  Negative for cough, chest tightness and shortness of breath.    Cardiovascular:  Negative for chest pain, palpitations and leg swelling.   Gastrointestinal:  Negative for abdominal distention, abdominal pain, blood in stool, constipation, diarrhea, nausea and vomiting.        Right groin pain and bulge   Genitourinary:  Negative for dysuria, frequency, hematuria and urgency.   Skin:  Negative for color change, pallor and rash.   Neurological:  Negative for weakness, light-headedness, numbness and headaches.   Hematological:  Negative for adenopathy. Does not bruise/bleed easily.   Psychiatric/Behavioral:  Negative for agitation and confusion.          Physical Findings   BMI 29.52 kg/m²   Physical Exam  Abdominal:      General: Abdomen is flat. There is no distension.      Palpations: Abdomen is soft.      Tenderness: There is no abdominal tenderness.      Hernia: A hernia is present. Hernia is present in the right inguinal area.           Comments: Right large inguinal hernia, tender to the touch, reducible            Assessment/Plan  1. Recurrent right inguinal  hernia          Félix Valero is a 64 year old male referred by Leticia Gerber MD for evaluation of right inguinal hernia. He has  large symptomatic recurrent and reducible right inguinal hernia. He has had an open repair in the past that failed after 26 years. I dicussed with him the treatment options and the risks and benefits of all treatment modalities. Will proceed with robotic right inguinal hernia. Discussed with him the risks and benefits and the post operative recovery . Patient agreeable to proceed with robotic right inguinal hernia repair. All questions were answered in detail.   Kari Pepe MD   30

## 2024-09-27 NOTE — ANESTHESIA POSTPROCEDURE EVALUATION
[facility]    Félix Valero Patient Status:  Hospital Outpatient Surgery   Age/Gender 64 year old male MRN [id number]   Location [facility] POST ANESTHESIA CARE UNIT Attending Kari Pepe MD   Hosp Day # 0 PCP Alejandrina Peters       Anesthesia Post-op Note    ROBOTIC  LAPAROSCOPIC RIGHT  INGUINAL HERNIA REPAIR    Procedure Summary       Date: 09/27/24 Room / Location:  MAIN OR 08 /  MAIN OR    Anesthesia Start: 1429 Anesthesia Stop: 1645    Procedure: ROBOTIC  LAPAROSCOPIC RIGHT  INGUINAL HERNIA REPAIR (Right: Abdomen) Diagnosis:       Unilateral inguinal hernia without obstruction or gangrene, recurrence not specified      (Unilateral inguinal hernia without obstruction or gangrene, recurrence not specified [K40.90])    Surgeons: Kari Pepe MD Anesthesiologist: Cami Rebollar MD    Anesthesia Type: general ASA Status: 3            Anesthesia Type: general    Vitals Value Taken Time   /74 09/27/24 1650   Temp 97.9 °F (36.6 °C) 09/27/24 1635   Pulse 60 09/27/24 1651   Resp 11 09/27/24 1651   SpO2 93 % 09/27/24 1651   Vitals shown include unfiled device data.    Patient Location: PACU    Anesthesia Type: general    Airway Patency: patent    Postop Pain Control: adequate    Mental Status: mildly sedated but able to meaningfully participate in the post-anesthesia evaluation    Nausea/Vomiting: none    Cardiopulmonary/Hydration status: stable euvolemic    Complications: no apparent anesthesia related complications    Postop vital signs: stable    Comments: Hand off to receiving nurse completed with essential components of patient's care reviewed, RN expressed comfort with assuming care, and all questions answered.     Dental Exam: Unchanged from Preop    Patient to be discharged from PACU when criteria met.

## 2024-10-10 ENCOUNTER — OFFICE VISIT (OUTPATIENT)
Facility: LOCATION | Age: 64
End: 2024-10-10
Payer: COMMERCIAL

## 2024-10-10 VITALS
WEIGHT: 196 LBS | SYSTOLIC BLOOD PRESSURE: 116 MMHG | BODY MASS INDEX: 29.36 KG/M2 | DIASTOLIC BLOOD PRESSURE: 62 MMHG | HEART RATE: 92 BPM | RESPIRATION RATE: 18 BRPM | HEIGHT: 68.5 IN | OXYGEN SATURATION: 96 % | TEMPERATURE: 98 F

## 2024-10-10 DIAGNOSIS — Z09 POSTOP CHECK: Primary | ICD-10-CM

## 2024-10-10 PROCEDURE — 3074F SYST BP LT 130 MM HG: CPT | Performed by: PHYSICIAN ASSISTANT

## 2024-10-10 PROCEDURE — 99024 POSTOP FOLLOW-UP VISIT: CPT | Performed by: PHYSICIAN ASSISTANT

## 2024-10-10 PROCEDURE — 3008F BODY MASS INDEX DOCD: CPT | Performed by: PHYSICIAN ASSISTANT

## 2024-10-10 PROCEDURE — 3078F DIAST BP <80 MM HG: CPT | Performed by: PHYSICIAN ASSISTANT

## 2024-10-10 NOTE — PROGRESS NOTES
Post Operative Visit Note       Active Problems  1. Postop check         Chief Complaint   Chief Complaint   Patient presents with    Post-Op     PO-09/27 jose francisco ROBOTIC  LAPAROSCOPIC RIGHT  INGUINAL HERNIA REPAIR            History of Present Illness   64 year old male presents for postop visit following robotic right inguinal hernia repair with mesh on 9/27/2024 with Dr. Pepe.  Patient states he has been recovering well.  He has mild incisional discomfort, though is no longer requiring any pain medication.  He is tolerating regular diet without nausea or vomiting.  Having normal bowel movements.  Denies any urinary complaints.  No fevers or chills.          Allergies  Félix is allergic to hydrocodone and propoxyphene.    Past Medical / Surgical / Social / Family History    The past medical and past surgical history have been reviewed by me today.     Past Medical History:    Anxiety state    Autism spectrum (HCC)    Autism spectrum disorder (HCC)    dx 2019    Back problem    Depression    Hearing impaired person, bilateral    hearing aids    High cholesterol    Hyperlipidemia    Migraines    Osteoarthritis    Tinnitus    Vertigo    Visual impairment    glasses     Past Surgical History:   Procedure Laterality Date    Adenoidectomy      x2    Hernia surgery      Other surgical history      Tonsillectomy         The family history and social history have been reviewed by me today.    Family History   Problem Relation Age of Onset    Pancreatic Cancer Mother     Bone Cancer Father      Social History     Socioeconomic History    Marital status: Single   Tobacco Use    Smoking status: Never    Smokeless tobacco: Never   Vaping Use    Vaping status: Never Used   Substance and Sexual Activity    Alcohol use: Yes     Alcohol/week: 1.0 standard drink of alcohol     Types: 1 Glasses of wine per week    Drug use: Never   Other Topics Concern    Caffeine Concern No    Exercise No        Current Outpatient Medications:      Meloxicam 15 MG Oral Tab, Take 1 tablet (15 mg total) by mouth daily as needed for Pain., Disp: 90 tablet, Rfl: 1    rosuvastatin 10 MG Oral Tab, Take 1 tablet (10 mg total) by mouth nightly., Disp: 90 tablet, Rfl: 1    divalproex  MG Oral Tablet 24 Hr, Take 1 tablet (250 mg total) by mouth daily., Disp: 90 tablet, Rfl: 3    Ginger, Zingiber officinalis, (GINGER ROOT OR), Take by mouth., Disp: , Rfl:     Multiple Vitamins-Minerals (CENTRUM MEN OR), Take by mouth., Disp: , Rfl:       Review of Systems  A 10 point Review of Systems has been completed by me today and is negative except as above in the HPI.    Physical Findings   /62   Pulse 92   Temp 97.7 °F (36.5 °C)   Resp 18   Ht 68.5\"   Wt 196 lb (88.9 kg)   SpO2 96%   BMI 29.37 kg/m²   Gen/psych: alert and oriented, cooperative, no apparent distress  Cardiovascular: regular rate  Respiratory: respirations unlabored, no wheeze  Abdominal: soft, non-tender, non-distended, no guarding/rebound  Incisions: Clean, dry, intact, no erythema, no drainage, resolving ecchymosis         Assessment/Plan  1. Postop check        64 year old male presents for postop visit following robotic right inguinal hernia repair with mesh on 9/27/2024 with Dr. Pepe.      The patient is doing well postoperatively   The patient is to continue with diet as tolerated.  The patient is to continue with lifting restrictions of no more than 15 pounds for 6 weeks from the date of the surgery.  All questions and concerns were answered.  The patient is return to the clinic as needed.         No orders of the defined types were placed in this encounter.       Imaging & Referrals   None    Follow Up  Return if symptoms worsen or fail to improve.    Mercedes Saha PA-C  [facility] General Surgery  10/10/2024  2:48 PM

## 2024-10-25 ENCOUNTER — TELEPHONE (OUTPATIENT)
Dept: FAMILY MEDICINE CLINIC | Facility: CLINIC | Age: 64
End: 2024-10-25

## 2024-12-09 RX ORDER — MELOXICAM 15 MG/1
15 TABLET ORAL
Qty: 90 TABLET | Refills: 1 | Status: SHIPPED | OUTPATIENT
Start: 2024-12-09

## 2024-12-09 NOTE — TELEPHONE ENCOUNTER
Last office visit 4/24/24  Last refilled on 5/13/24 for # 90 with 1 refills  No future appointments.     Thank you.

## 2024-12-10 RX ORDER — DIVALPROEX SODIUM 250 MG/1
250 TABLET, FILM COATED, EXTENDED RELEASE ORAL DAILY
Qty: 90 TABLET | Refills: 0 | Status: SHIPPED | OUTPATIENT
Start: 2024-12-10

## 2024-12-10 NOTE — TELEPHONE ENCOUNTER
[employer] message sent to schedule appointment.    Medication: Divalproex ER 250mg     Date of last refill: 10/17/263 (#90/3)  [place]      Last office visit: 10/17/23  Due back to clinic per last office note:  9m  Date next office visit scheduled:    No future appointments.        Last OV note recommendation:    Cont depakote low dose, refills sent  See orders and medications filed with this encounter. The patient indicates understanding of these issues and agrees with the plan.  Discussed with patient/family regarding assessment, care plan and possible adverse and side effects of the medications.   RTC 9-12 months  Pt should go ER for any new or worsening

## 2024-12-16 LAB
ALBUMIN/GLOBULIN RATIO: 2.3 (CALC) (ref 1–2.5)
ALBUMIN: 4.4 G/DL (ref 3.6–5.1)
ALKALINE PHOSPHATASE: 56 U/L (ref 35–144)
ALT: 21 U/L (ref 9–46)
AST: 19 U/L (ref 10–35)
BILIRUBIN, TOTAL: 0.4 MG/DL (ref 0.2–1.2)
BUN: 18 MG/DL (ref 7–25)
CALCIUM: 8.8 MG/DL (ref 8.6–10.3)
CARBON DIOXIDE: 28 MMOL/L (ref 20–32)
CHLORIDE: 103 MMOL/L (ref 98–110)
CHOL/HDLC RATIO: 3.2 (CALC)
CHOLESTEROL, TOTAL: 171 MG/DL
CREATININE: 0.93 MG/DL (ref 0.7–1.35)
EGFR: 93 ML/MIN/1.73M2
GLOBULIN: 1.9 G/DL (CALC) (ref 1.9–3.7)
GLUCOSE: 79 MG/DL (ref 65–99)
HDL CHOLESTEROL: 53 MG/DL
LDL-CHOLESTEROL: 101 MG/DL (CALC)
NON-HDL CHOLESTEROL: 118 MG/DL (CALC)
POTASSIUM: 4 MMOL/L (ref 3.5–5.3)
PROTEIN, TOTAL: 6.3 G/DL (ref 6.1–8.1)
SODIUM: 141 MMOL/L (ref 135–146)
TRIGLYCERIDES: 79 MG/DL

## 2024-12-18 ENCOUNTER — TELEPHONE (OUTPATIENT)
Dept: FAMILY MEDICINE CLINIC | Facility: CLINIC | Age: 64
End: 2024-12-18

## 2024-12-18 NOTE — TELEPHONE ENCOUNTER
----- Message from Alejandrina Peters sent at 12/18/2024  8:31 AM CST -----  Results reviewed.   Cholesterol stable  Chemistries normal

## 2024-12-18 NOTE — TELEPHONE ENCOUNTER
Written by OTIS Medrano on 12/18/2024  8:31 AM CST  Seen by proxy Semaj Valero on 12/18/2024  8:44 AM

## 2024-12-24 ENCOUNTER — OFFICE VISIT (OUTPATIENT)
Dept: NEUROLOGY | Facility: CLINIC | Age: 64
End: 2024-12-24
Payer: COMMERCIAL

## 2024-12-24 VITALS
DIASTOLIC BLOOD PRESSURE: 76 MMHG | SYSTOLIC BLOOD PRESSURE: 120 MMHG | RESPIRATION RATE: 16 BRPM | HEART RATE: 87 BPM | WEIGHT: 199 LBS | BODY MASS INDEX: 30 KG/M2

## 2024-12-24 DIAGNOSIS — G43.709 CHRONIC MIGRAINE W/O AURA W/O STATUS MIGRAINOSUS, NOT INTRACTABLE: Primary | ICD-10-CM

## 2024-12-24 PROCEDURE — 99215 OFFICE O/P EST HI 40 MIN: CPT | Performed by: OTHER

## 2024-12-24 PROCEDURE — 3078F DIAST BP <80 MM HG: CPT | Performed by: OTHER

## 2024-12-24 PROCEDURE — 3074F SYST BP LT 130 MM HG: CPT | Performed by: OTHER

## 2024-12-24 RX ORDER — DIVALPROEX SODIUM 250 MG/1
250 TABLET, FILM COATED, EXTENDED RELEASE ORAL DAILY
Qty: 90 TABLET | Refills: 3 | Status: SHIPPED | OUTPATIENT
Start: 2024-12-24

## 2024-12-24 NOTE — PROGRESS NOTES
[facility] Neurology Outpatient Progress Note  Date of service: 12/24/2024    Assessment:     ICD-10-CM    1. Chronic migraine w/o aura w/o status migrainosus, not intractable  G43.709       stable    Migraine   Vertigo  Tinnitus, unspecified laterality  SNHL     Plan:  Cont depakote low dose, refills sent  See orders and medications filed with this encounter. The patient indicates understanding of these issues and agrees with the plan.  Discussed with patient/family regarding assessment, care plan and possible adverse and side effects of the medications.   RTC 9-12 months  Pt should go ER for any new or worsening  A total of 40 minutes were spent on patient care,  more than 50% was spent counseling patient/family regarding studies' results, assessment, treatment options and care plan, 10 minutes were spent in (pre- and/or during visit) reviewing clinical data including imaging, labs and progress notes related to therapy or treatment.      Subjective:   History:  Patient here for a follow-up visit re; migraine , vertigo ; he has improved significantly, no side effect reported from low dose depakote . Willing to continue.  Per initial visit note:  Félix Valero is a 63 year old male with past medical history as listed below presents here for initial evaluation of chronic headache and vertigo, has tinnitus, hearing loss, this started last august in [place]. CT head was unremarkable in [place] last October, tried therapy and saw ENT, he takes tylenol as needed for headache. He states these headache and vertigo occur quite frequently and has affected his daily activity and life quality, he asked to call his brother Raudel during visit which I tried but Raudel was not available but I reach out to him later for update;  No new focal weakness, numbness, speech difficulties.    History/Other:   REVIEW OF SYSTEMS:  A 10-point system was reviewed. Pertinent positives and negatives are noted as above       Current Outpatient Medications:      divalproex  MG Oral Tablet 24 Hr, Take 1 tablet (250 mg total) by mouth daily., Disp: 90 tablet, Rfl: 3    MELOXICAM 15 MG Oral Tab, TAKE 1 TABLET(15 MG) BY MOUTH DAILY AS NEEDED FOR PAIN, Disp: 90 tablet, Rfl: 1    rosuvastatin 10 MG Oral Tab, Take 1 tablet (10 mg total) by mouth nightly., Disp: 90 tablet, Rfl: 1    Ginger, Zingiber officinalis, (GINGER ROOT OR), Take by mouth., Disp: , Rfl:     Multiple Vitamins-Minerals (CENTRUM MEN OR), Take by mouth., Disp: , Rfl:   Allergies:  Allergies[1]  Past Medical History:    Anxiety state    Autism spectrum (HCC)    Autism spectrum disorder (HCC)    dx 2019    Back problem    Depression    Hearing impaired person, bilateral    hearing aids    High cholesterol    Hyperlipidemia    Migraines    Osteoarthritis    Tinnitus    Vertigo    Visual impairment    glasses     Past Surgical History:   Procedure Laterality Date    Adenoidectomy      x2    Hernia surgery      Other surgical history      Tonsillectomy       Social History:  Social History     Tobacco Use    Smoking status: Never    Smokeless tobacco: Never   Substance Use Topics    Alcohol use: Yes     Alcohol/week: 1.0 standard drink of alcohol     Types: 1 Glasses of wine per week     Family History   Problem Relation Age of Onset    Pancreatic Cancer Mother     Bone Cancer Father       Objective:   Neurological Examination:  /76   Pulse 87   Resp 16   Wt 199 lb (90.3 kg)   BMI 29.82 kg/m²   Language: normal   Speech: no dysarthria  CN: II-XII +horizontal nystagmus with right gaze, SNHL bilaterally, other normal  Motor strength: 5/5 all extremities, right foot +AFO  Tone: normal  Coordination: Normal  Sensory: symmetric   Gait: nl    Test reviewed on 12/24/2024      Conner Robb"Adrian\" MD En  Neurology  [facility]  12/24/2024, 9:34 AM  CC: Alejandrina Peters       [1]   Allergies  Allergen Reactions    Hydrocodone Tightness in Chest    Propoxyphene Tightness in Chest

## 2024-12-24 NOTE — PATIENT INSTRUCTIONS
Refill policies:    Allow 2-3 business days for refills; controlled substances may take longer.  Contact your pharmacy at least 5 days prior to running out of medication and have them send an electronic request or submit request through the “request refill” option in your [employer] account.  Refills are not addressed on weekends; covering physicians do not authorize routine medications on weekends.  No narcotics or controlled substances are refilled after noon on Fridays or by on call physicians.  By law, narcotics must be electronically prescribed.  A 30 day supply with no refills is the maximum allowed.  If your prescription is due for a refill, you may be due for a follow up appointment.  To best provide you care, patients receiving routine medications need to be seen at least once a year.  Patients receiving narcotic/controlled substance medications need to be seen at least once every 3 months.  In the event that your preferred pharmacy does not have the requested medication in stock (e.g. Backordered), it is your responsibility to find another pharmacy that has the requested medication available.  We will gladly send a new prescription to that pharmacy at your request.    Scheduling Tests:    If your physician has ordered radiology tests such as MRI or CT scans, please contact Central Scheduling at [phone] right away to schedule the test.  Once scheduled, the [facility] Centralized Referral Team will work with your insurance carrier to obtain pre-certification or prior authorization.  Depending on your insurance carrier, approval may take 3-10 days.  It is highly recommended patients assure they have received an authorization before having a test performed.  If test is done without insurance authorization, patient may be responsible for the entire amount billed.      Precertification and Prior Authorizations:  If your physician has recommended that you have a procedure or additional testing performed the [facility]  Centralized Referral Team will contact your insurance carrier to obtain pre-certification or prior authorization.    You are strongly encouraged to contact your insurance carrier to verify that your procedure/test has been approved and is a COVERED benefit.  Although the [facility] Centralized Referral Team does its due diligence, the insurance carrier gives the disclaimer that \"Although the procedure is authorized, this does not guarantee payment.\"    Ultimately the patient is responsible for payment.   Thank you for your understanding in this matter.  Paperwork Completion:  If you require FMLA or disability paperwork for your recovery, please make sure to either drop it off or have it faxed to our office at [phone]. Be sure the form has your name and date of birth on it.  The form will be faxed to our Forms Department and they will complete it for you.  There is a 25$ fee for all forms that need to be filled out.  Please be aware there is a 10-14 day turnaround time.  You will need to sign a release of information (MURIEL) form if your paperwork does not come with one.  You may call the Forms Department with any questions at [phone].  Their fax number is [phone].

## 2025-02-03 RX ORDER — ROSUVASTATIN CALCIUM 10 MG/1
10 TABLET, COATED ORAL NIGHTLY
Qty: 90 TABLET | Refills: 0 | Status: SHIPPED | OUTPATIENT
Start: 2025-02-03

## 2025-02-13 ENCOUNTER — PATIENT OUTREACH (OUTPATIENT)
Dept: FAMILY MEDICINE CLINIC | Facility: CLINIC | Age: 65
End: 2025-02-13

## 2025-02-17 ENCOUNTER — TELEPHONE (OUTPATIENT)
Dept: FAMILY MEDICINE CLINIC | Facility: CLINIC | Age: 65
End: 2025-02-17

## 2025-02-17 DIAGNOSIS — E78.00 PURE HYPERCHOLESTEROLEMIA: ICD-10-CM

## 2025-02-17 DIAGNOSIS — Z00.00 ANNUAL PHYSICAL EXAM: Primary | ICD-10-CM

## 2025-02-17 DIAGNOSIS — Z12.5 PROSTATE CANCER SCREENING: ICD-10-CM

## 2025-02-17 NOTE — TELEPHONE ENCOUNTER
Patients brother is calling to ask if provider can send blood work [facility] for him to get done prior to his upcoming appointment for physical.       Future Appointments   Date Time Provider Department Center   4/22/2025  3:20 PM [facility]

## 2025-03-19 ENCOUNTER — TELEPHONE (OUTPATIENT)
Dept: FAMILY MEDICINE CLINIC | Facility: CLINIC | Age: 65
End: 2025-03-19

## 2025-03-19 NOTE — TELEPHONE ENCOUNTER
Patient due for labs  MCM sent to patient  Future Appointments   Date Time Provider Department Center   4/22/2025  3:20 PM Leticia Gerber MD [facility] [place]

## 2025-04-16 LAB
ALBUMIN/GLOBULIN RATIO: 2.4 (CALC) (ref 1–2.5)
ALBUMIN: 4.5 G/DL (ref 3.6–5.1)
ALKALINE PHOSPHATASE: 53 U/L (ref 35–144)
ALT: 18 U/L (ref 9–46)
AST: 18 U/L (ref 10–35)
BILIRUBIN, TOTAL: 0.6 MG/DL (ref 0.2–1.2)
BUN: 15 MG/DL (ref 7–25)
CALCIUM: 8.8 MG/DL (ref 8.6–10.3)
CARBON DIOXIDE: 29 MMOL/L (ref 20–32)
CHLORIDE: 103 MMOL/L (ref 98–110)
CHOL/HDLC RATIO: 3.5 (CALC)
CHOLESTEROL, TOTAL: 178 MG/DL
CREATININE: 0.89 MG/DL (ref 0.7–1.35)
EGFR: 96 ML/MIN/1.73M2
GLOBULIN: 1.9 G/DL (CALC) (ref 1.9–3.7)
GLUCOSE: 76 MG/DL (ref 65–99)
HDL CHOLESTEROL: 51 MG/DL
HEMATOCRIT: 48.7 % (ref 38.5–50)
HEMOGLOBIN A1C: 5.7 %
HEMOGLOBIN: 15.7 G/DL (ref 13.2–17.1)
LDL-CHOLESTEROL: 105 MG/DL (CALC)
MCH: 29.9 PG (ref 27–33)
MCHC: 32.2 G/DL (ref 32–36)
MCV: 92.8 FL (ref 80–100)
MPV: 11.3 FL (ref 7.5–12.5)
NON-HDL CHOLESTEROL: 127 MG/DL (CALC)
PLATELET COUNT: 205 THOUSAND/UL (ref 140–400)
POTASSIUM: 4 MMOL/L (ref 3.5–5.3)
PROTEIN, TOTAL: 6.4 G/DL (ref 6.1–8.1)
PSA, TOTAL: 4.64 NG/ML
RDW: 14.2 % (ref 11–15)
RED BLOOD CELL COUNT: 5.25 MILLION/UL (ref 4.2–5.8)
SODIUM: 140 MMOL/L (ref 135–146)
TRIGLYCERIDES: 119 MG/DL
TSH W/REFLEX TO FT4: 1.1 MIU/L (ref 0.4–4.5)
WHITE BLOOD CELL COUNT: 6.6 THOUSAND/UL (ref 3.8–10.8)

## 2025-04-17 ENCOUNTER — PATIENT MESSAGE (OUTPATIENT)
Dept: FAMILY MEDICINE CLINIC | Facility: CLINIC | Age: 65
End: 2025-04-17

## 2025-04-17 DIAGNOSIS — R97.20 ELEVATED PSA: Primary | ICD-10-CM

## 2025-04-17 NOTE — TELEPHONE ENCOUNTER
----- Message from Alejandrina Peters sent at 4/17/2025 10:04 AM CDT -----  Results reviewed.   Cholesterol excellent, continue statin  A1C in prediabetic range. Needs to work on lower carb diet  PSA elevated, patient needs to see urology. Refer to Dr. Jones  No anemia  Chemistries normal  Thyroid function normal    ----- Message -----  From: Raven Verdugo In  Sent: 4/16/2025   3:00 AM CDT  To: Leticia Gerber MD

## 2025-04-21 NOTE — TELEPHONE ENCOUNTER
Future Appointments   Date Time Provider Department Center   4/22/2025  3:20 PM Leticia Gerber MD [facility] [place]

## 2025-04-22 ENCOUNTER — OFFICE VISIT (OUTPATIENT)
Dept: FAMILY MEDICINE CLINIC | Facility: CLINIC | Age: 65
End: 2025-04-22
Payer: COMMERCIAL

## 2025-04-22 VITALS
OXYGEN SATURATION: 99 % | HEIGHT: 68 IN | TEMPERATURE: 97 F | WEIGHT: 200 LBS | HEART RATE: 74 BPM | RESPIRATION RATE: 18 BRPM | SYSTOLIC BLOOD PRESSURE: 130 MMHG | DIASTOLIC BLOOD PRESSURE: 88 MMHG | BODY MASS INDEX: 30.31 KG/M2

## 2025-04-22 DIAGNOSIS — Z00.00 ANNUAL PHYSICAL EXAM: Primary | ICD-10-CM

## 2025-04-22 DIAGNOSIS — M21.41 FLAT FEET: ICD-10-CM

## 2025-04-22 DIAGNOSIS — Z12.11 SCREENING FOR COLON CANCER: ICD-10-CM

## 2025-04-22 DIAGNOSIS — M21.42 FLAT FEET: ICD-10-CM

## 2025-04-22 DIAGNOSIS — E78.00 PURE HYPERCHOLESTEROLEMIA: ICD-10-CM

## 2025-04-22 DIAGNOSIS — F84.0 AUTISM (HCC): ICD-10-CM

## 2025-04-22 PROCEDURE — 3008F BODY MASS INDEX DOCD: CPT | Performed by: FAMILY MEDICINE

## 2025-04-22 PROCEDURE — 99396 PREV VISIT EST AGE 40-64: CPT | Performed by: FAMILY MEDICINE

## 2025-04-22 PROCEDURE — 3075F SYST BP GE 130 - 139MM HG: CPT | Performed by: FAMILY MEDICINE

## 2025-04-22 PROCEDURE — 3079F DIAST BP 80-89 MM HG: CPT | Performed by: FAMILY MEDICINE

## 2025-04-22 RX ORDER — ROSUVASTATIN CALCIUM 10 MG/1
10 TABLET, COATED ORAL NIGHTLY
Qty: 90 TABLET | Refills: 3 | Status: SHIPPED | OUTPATIENT
Start: 2025-04-22

## 2025-04-22 RX ORDER — MELOXICAM 15 MG/1
15 TABLET ORAL
Qty: 90 TABLET | Refills: 1 | Status: SHIPPED | OUTPATIENT
Start: 2025-04-22

## 2025-04-22 NOTE — PROGRESS NOTES
Félix Valero is a 65 year old male who presents for a complete physical exam.   HPI:   No concerns today.  He is here with brother.   Labs reviewed. Recommend to see Dr. Jones for psa level elevated.   Prediabetic discuss low carb diet and exercise.   He does report he sits a lot so recommend more walking     Immunization History   Administered Date(s) Administered    Covid-19 Vaccine Pfizer 3 mcg/0.2 mL 6mo-4Y 05/31/2021, 06/21/2021, 01/05/2022     Wt Readings from Last 6 Encounters:   04/22/25 200 lb (90.7 kg)   12/24/24 199 lb (90.3 kg)   10/10/24 196 lb (88.9 kg)   09/27/24 196 lb (88.9 kg)   07/18/24 197 lb (89.4 kg)   04/24/24 201 lb (91.2 kg)     Body mass index is 30.41 kg/m².     Lab Results   Component Value Date    GLU 76 04/15/2025    GLU 79 12/16/2024    GLU 79 04/25/2024     Lab Results   Component Value Date    CHOLEST 178 04/15/2025    CHOLEST 171 12/16/2024    CHOLEST 191 04/25/2024     Lab Results   Component Value Date    HDL 51 04/15/2025    HDL 53 12/16/2024    HDL 52 04/25/2024     Lab Results   Component Value Date     (H) 04/15/2025     (H) 12/16/2024     (H) 04/25/2024     Lab Results   Component Value Date    AST 18 04/15/2025    AST 19 12/16/2024    AST 21 04/25/2024     Lab Results   Component Value Date    ALT 18 04/15/2025    ALT 21 12/16/2024    ALT 26 04/25/2024     No results found for: \"PSA\"     Current Medications[1]   Past Medical History[2]   Past Surgical History[3]   Family History[4]   Social History:  Short Social Hx on File[5]        REVIEW OF SYSTEMS:   GENERAL: feels well   SKIN: denies any unusual skin lesions  EYES:denies blurred vision or double vision  HEENT: denies nasal congestion, sinus pain or ST  LUNGS: denies shortness of breath or cough with exertion  CARDIOVASCULAR: denies chest pain or palpitations  GI: denies abdominal pain. Denies heartburn. Has regular bowel movements. No blood in stool.  : denies nocturia or changes in stream. No  hematuria.  NEURO: denies headaches or dizziness  PSYCHE: denies depression or anxiety      EXAM:   /88 (BP Location: Left arm, Patient Position: Sitting, Cuff Size: adult)   Pulse 74   Temp 96.8 °F (36 °C) (Temporal)   Resp 18   Ht 5' 8\" (1.727 m)   Wt 200 lb (90.7 kg)   SpO2 99%   BMI 30.41 kg/m²   Body mass index is 30.41 kg/m².   GENERAL: well nourished,in no apparent distress  SKIN: no rashes  HEENT: ears and throat are clear  EYES:,conjunctiva are clear  NECK: supple,no adenopathy,no bruits. No thyromegaly  BREAST: no dominant or suspicious mass  LUNGS: clear to auscultation  CARDIO: RRR without murmur  GI: soft, good BS's,no masses, HSM or tenderness  EXTREMITIES: no edema  NEURO: cranial nerves are intact,motor and sensory are grossly intact    ASSESSMENT AND PLAN:   Félix Valero is a 65 year old male who presents for a complete physical exam. Pt's weight is Body mass index is 30.41 kg/m²., recommended low fat/carb diet and aerobic exercise 30-45 minutes 5 times weekly.   [facility]   The patient indicates understanding of these issues and agrees to the plan.           [1]   Current Outpatient Medications   Medication Sig Dispense Refill    ROSUVASTATIN 10 MG Oral Tab TAKE 1 TABLET(10 MG) BY MOUTH EVERY NIGHT 90 tablet 0    divalproex  MG Oral Tablet 24 Hr Take 1 tablet (250 mg total) by mouth daily. 90 tablet 3    MELOXICAM 15 MG Oral Tab TAKE 1 TABLET(15 MG) BY MOUTH DAILY AS NEEDED FOR PAIN 90 tablet 1    Ginger, Zingiber officinalis, (GINGER ROOT OR) Take by mouth.      Multiple Vitamins-Minerals (CENTRUM MEN OR) Take by mouth.     [2]   Past Medical History:   Anxiety state    Autism spectrum (HCC)    Autism spectrum disorder (HCC)    dx 2019    Back problem    Depression    Hearing impaired person, bilateral    hearing aids    High cholesterol    Hyperlipidemia    Migraines    Osteoarthritis    Tinnitus    Vertigo    Visual impairment     glasses   [3]   Past Surgical History:  Procedure Laterality Date    Adenoidectomy      x2    Hernia surgery      Other surgical history      Tonsillectomy     [4]   Family History  Problem Relation Age of Onset    Pancreatic Cancer Mother     Bone Cancer Father    [5]   Social History  Socioeconomic History    Marital status: Single   Tobacco Use    Smoking status: Never    Smokeless tobacco: Never   Vaping Use    Vaping status: Never Used   Substance and Sexual Activity    Alcohol use: Yes     Alcohol/week: 1.0 standard drink of alcohol     Types: 1 Glasses of wine per week    Drug use: Never   Other Topics Concern    Caffeine Concern No    Exercise No

## 2025-05-27 ENCOUNTER — PATIENT MESSAGE (OUTPATIENT)
Dept: FAMILY MEDICINE CLINIC | Facility: CLINIC | Age: 65
End: 2025-05-27

## 2025-05-28 ENCOUNTER — TELEPHONE (OUTPATIENT)
Dept: FAMILY MEDICINE CLINIC | Facility: CLINIC | Age: 65
End: 2025-05-28

## 2025-05-28 RX ORDER — MELOXICAM 15 MG/1
15 TABLET ORAL
Qty: 90 TABLET | Refills: 1 | Status: SHIPPED | OUTPATIENT
Start: 2025-05-28

## 2025-05-28 NOTE — TELEPHONE ENCOUNTER
Received a letter from [facility]    \"Has not received sufficient information to process a bill or claim for the patient\"    Needed additional diagnosis for lab orders that were done on 4/15/2025    Added screening for prostate, anemia, diabetes mellitus and thyroid disorder.  Faxed to [phone]

## 2025-05-28 NOTE — TELEPHONE ENCOUNTER
Last refill: 04/22/25  Qty 90  W/ 1 refills  Last ov: 04/22/25    Requested Prescriptions     Pending Prescriptions Disp Refills    MELOXICAM 15 MG Oral Tab [Pharmacy Med Name: MELOXICAM 15MG TABLETS] 90 tablet 1     Sig: TAKE 1 TABLET(15 MG) BY MOUTH DAILY AS NEEDED FOR PAIN     No future appointments.

## 2025-06-02 ENCOUNTER — TELEPHONE (OUTPATIENT)
Dept: FAMILY MEDICINE CLINIC | Facility: CLINIC | Age: 65
End: 2025-06-02

## 2025-06-02 NOTE — TELEPHONE ENCOUNTER
6/2/2025  1:18 PM Y Cristy Savage RN Patient Medical Advice Request  Cologuard results     Brother reviewed Mychart message

## 2025-07-11 ENCOUNTER — TELEPHONE (OUTPATIENT)
Dept: FAMILY MEDICINE CLINIC | Facility: CLINIC | Age: 65
End: 2025-07-11

## 2025-07-11 NOTE — TELEPHONE ENCOUNTER
Left message to call office back     Dr Amrit Herron with [facility]. His phone number is [phone]    Or will need to call insurance for list of providers in network where he lives

## 2025-07-11 NOTE — TELEPHONE ENCOUNTER
Patient [facility] called said they are looking for an ENT for patient close to home that take his insurance.   Per [facility] recommended to see ENT

## 2025-07-29 ENCOUNTER — OFFICE VISIT (OUTPATIENT)
Facility: LOCATION | Age: 65
End: 2025-07-29

## 2025-07-29 DIAGNOSIS — H81.11 BENIGN PAROXYSMAL POSITIONAL VERTIGO OF RIGHT EAR: ICD-10-CM

## 2025-07-29 DIAGNOSIS — G43.009 MIGRAINE WITHOUT AURA AND WITHOUT STATUS MIGRAINOSUS, NOT INTRACTABLE: Primary | ICD-10-CM

## 2025-07-29 DIAGNOSIS — G43.809 VESTIBULAR MIGRAINE: ICD-10-CM

## 2025-07-29 DIAGNOSIS — H91.22 SUDDEN HEARING LOSS, LEFT: ICD-10-CM

## 2025-07-29 PROCEDURE — 99204 OFFICE O/P NEW MOD 45 MIN: CPT | Performed by: OTOLARYNGOLOGY

## 2025-07-29 RX ORDER — PREDNISONE 20 MG/1
TABLET ORAL
Qty: 25 TABLET | Refills: 0 | Status: SHIPPED | OUTPATIENT
Start: 2025-07-29 | End: 2025-08-11

## 2025-07-29 RX ORDER — ONDANSETRON 4 MG/1
4 TABLET, ORALLY DISINTEGRATING ORAL EVERY 8 HOURS PRN
Qty: 10 TABLET | Refills: 1 | Status: SHIPPED | OUTPATIENT
Start: 2025-07-29

## 2025-07-29 RX ORDER — MECLIZINE HYDROCHLORIDE 25 MG/1
25 TABLET ORAL 3 TIMES DAILY PRN
Qty: 30 TABLET | Refills: 0 | Status: SHIPPED | OUTPATIENT
Start: 2025-07-29

## 2025-08-29 ENCOUNTER — OFFICE VISIT (OUTPATIENT)
Dept: AUDIOLOGY | Facility: CLINIC | Age: 65
End: 2025-08-29

## 2025-08-29 DIAGNOSIS — H90.3 SENSORINEURAL HEARING LOSS, BILATERAL: ICD-10-CM

## 2025-08-29 DIAGNOSIS — H90.3 SENSORINEURAL HEARING LOSS (SNHL), BILATERAL: Primary | ICD-10-CM

## (undated) DEVICE — GLOVE SUR 6.5 SENSICARE PI PIP CRM PWD F

## (undated) DEVICE — ENDOPATH ULTRA VERESS INSUFFLATION NEEDLES WITH LUER LOCK CONNECTORS: Brand: ENDOPATH

## (undated) DEVICE — FORCE BIPOLAR: Brand: ENDOWRIST

## (undated) DEVICE — ROBOTIC GENERAL: Brand: MEDLINE INDUSTRIES, INC.

## (undated) DEVICE — ABSORBABLE WOUND CLOSURE DEVICE: Brand: V-LOC 90

## (undated) DEVICE — ADHESIVE SKIN TOP FOR WND CLSR DERMBND ADV

## (undated) DEVICE — 3M™ IOBAN™ 2 ANTIMICROBIAL INCISE DRAPE 6648EZ: Brand: IOBAN™ 2

## (undated) DEVICE — COVER,LIGHT,CAMERA,HARD,1/PK,STRL: Brand: MEDLINE

## (undated) DEVICE — SKN PREP SPNG STKS PVP PNT STR: Brand: MEDLINE INDUSTRIES, INC.

## (undated) DEVICE — BLADELESS OBTURATOR: Brand: WECK VISTA

## (undated) DEVICE — 40580 - THE PINK PAD - ADVANCED TRENDELENBURG POSITIONING KIT: Brand: 40580 - THE PINK PAD - ADVANCED TRENDELENBURG POSITIONING KIT

## (undated) DEVICE — GLOVE SUR 7.5 SENSICARE PI PIP GRN PWD F

## (undated) DEVICE — LAPAROVUE VISIBILITY SYSTEM LAPAROSCOPIC SOLUTIONS: Brand: LAPAROVUE

## (undated) DEVICE — GLOVE SUR 7 SENSICARE PI PIP CRM PWD F

## (undated) DEVICE — MEGA NEEDLE DRIVER: Brand: ENDOWRIST

## (undated) DEVICE — COLUMN DRAPE

## (undated) DEVICE — TIP COVER ACCESSORY

## (undated) DEVICE — MONOPOLAR CURVED SCISSORS: Brand: ENDOWRIST

## (undated) DEVICE — ANTIBACTERIAL UNDYED BRAIDED (POLYGLACTIN 910), SYNTHETIC ABSORBABLE SUTURE: Brand: COATED VICRYL

## (undated) DEVICE — STERILE DRAPE FOR USE WITH SITUATE ROOM SCANNER: Brand: SITUATE

## (undated) DEVICE — CANNULA SEAL

## (undated) DEVICE — DRAPE,TOP,102X53,STERILE: Brand: MEDLINE

## (undated) DEVICE — SUT MCRYL 4-0 18IN PS-2 ABSRB UD 19MM 3/8 CIR

## (undated) DEVICE — ARM DRAPE

## (undated) DEVICE — TRAY CATH 16FR F INCL BARDX IC COMPLT CARE

## (undated) NOTE — MR AVS SNAPSHOT
After Visit Summary   2023    Mabel Knight   MRN: [id number]           Visit Information     Date & Time  2023 12:00 PM Provider  [address] [facility] EEG Dept. Phone  [phone]      Allergies as of 2023  Review status set to In Progress on 2023       Noted Reaction Type Reactions    Hydrocodone-acetaminophen 2023    Tightness in Chest      Your Current Medications        Dosage    LORazepam 0.5 MG Oral Tab Take 1 tablet (0.5 mg total) by mouth every 6 (six) hours as needed (dizziness). ondansetron 4 MG Oral Tablet Dispersible Take 1 tablet (4 mg total) by mouth every 4 (four) hours as needed for Nausea. Meloxicam 15 MG Oral Tab Take 1 tablet (15 mg total) by mouth daily as needed for Pain. Ginger, Zingiber officinalis, (GINGER ROOT OR) Take by mouth. Multiple Vitamins-Minerals (CENTRUM MEN OR) Take by mouth. divalproex  MG Oral Tablet 24 Hr Take 1 tablet (250 mg total) by mouth daily. rosuvastatin 10 MG Oral Tab () Take 1 tablet (10 mg total) by mouth nightly. Diagnoses for This Visit    Vertigo   [[id number]]    Tinnitus, unspecified laterality   [[id number]]             We Ordered the Following     Normal Orders This Visit    EEG [NEU4 CUSTOM]                 Did you know that [facility] primary care physicians now offer Video Visits through [address] for adult patients for a variety of conditions such as allergies, back pain and cold symptoms? Skip the drive and waiting room and online chat with a doctor face-to-face using your web-cam enabled computer or mobile device wherever you are. Video Visits cost $50 and can be paid hassle-free using a credit, debit, or health savings card. Not active on [employer] Ask us how to get signed up today! If you receive a survey from [facility], please take a few minutes to complete it and provide feedback. We strive to deliver the best patient experience and are looking for ways to make improvements. Your feedback will help us do so. For more information on [employer], please visit [email or web address]           No text in SmartText           No text in SmartText